# Patient Record
Sex: FEMALE | Race: WHITE | NOT HISPANIC OR LATINO | Employment: FULL TIME | URBAN - METROPOLITAN AREA
[De-identification: names, ages, dates, MRNs, and addresses within clinical notes are randomized per-mention and may not be internally consistent; named-entity substitution may affect disease eponyms.]

---

## 2020-01-15 LAB
HPV ABSTRACT: NORMAL
PAP-ABSTRACT: NORMAL

## 2020-07-27 LAB
ALT SERPL-CCNC: 12 U/L (ref 0–55)
HEP C HIM: NORMAL

## 2020-08-25 ENCOUNTER — TRANSFERRED RECORDS (OUTPATIENT)
Dept: MULTI SPECIALTY CLINIC | Facility: CLINIC | Age: 30
End: 2020-08-25

## 2020-08-25 LAB — HIV 1&2 EXT: NORMAL

## 2021-10-05 ENCOUNTER — OFFICE VISIT (OUTPATIENT)
Dept: FAMILY MEDICINE | Facility: CLINIC | Age: 31
End: 2021-10-05
Payer: COMMERCIAL

## 2021-10-05 VITALS — HEART RATE: 88 BPM | SYSTOLIC BLOOD PRESSURE: 112 MMHG | DIASTOLIC BLOOD PRESSURE: 72 MMHG | OXYGEN SATURATION: 99 %

## 2021-10-05 DIAGNOSIS — E04.1 LEFT THYROID NODULE: ICD-10-CM

## 2021-10-05 DIAGNOSIS — Z11.59 NEED FOR HEPATITIS C SCREENING TEST: ICD-10-CM

## 2021-10-05 DIAGNOSIS — Z11.4 SCREENING FOR HIV (HUMAN IMMUNODEFICIENCY VIRUS): ICD-10-CM

## 2021-10-05 DIAGNOSIS — Z00.00 ANNUAL PHYSICAL EXAM: Primary | ICD-10-CM

## 2021-10-05 PROBLEM — F41.1 GAD (GENERALIZED ANXIETY DISORDER): Status: ACTIVE | Noted: 2021-10-05

## 2021-10-05 PROBLEM — F33.1 MODERATE RECURRENT MAJOR DEPRESSION (H): Status: ACTIVE | Noted: 2021-10-05

## 2021-10-05 PROBLEM — K64.4 EXTERNAL HEMORRHOIDS: Status: ACTIVE | Noted: 2021-10-05

## 2021-10-05 PROBLEM — G43.109 MIGRAINE WITH AURA AND WITHOUT STATUS MIGRAINOSUS, NOT INTRACTABLE: Status: ACTIVE | Noted: 2021-10-05

## 2021-10-05 PROBLEM — Z86.59 HISTORY OF EATING DISORDER: Status: ACTIVE | Noted: 2021-10-05

## 2021-10-05 LAB — HIV 1+2 AB+HIV1 P24 AG SERPL QL IA: NEGATIVE

## 2021-10-05 PROCEDURE — 86803 HEPATITIS C AB TEST: CPT | Performed by: FAMILY MEDICINE

## 2021-10-05 PROCEDURE — 99385 PREV VISIT NEW AGE 18-39: CPT | Mod: 25 | Performed by: FAMILY MEDICINE

## 2021-10-05 PROCEDURE — 87389 HIV-1 AG W/HIV-1&-2 AB AG IA: CPT | Performed by: FAMILY MEDICINE

## 2021-10-05 PROCEDURE — 90686 IIV4 VACC NO PRSV 0.5 ML IM: CPT | Performed by: FAMILY MEDICINE

## 2021-10-05 PROCEDURE — 90471 IMMUNIZATION ADMIN: CPT | Performed by: FAMILY MEDICINE

## 2021-10-05 PROCEDURE — 36415 COLL VENOUS BLD VENIPUNCTURE: CPT | Performed by: FAMILY MEDICINE

## 2021-10-05 RX ORDER — LORAZEPAM 0.5 MG/1
0.5 TABLET ORAL DAILY
COMMUNITY
End: 2022-07-29

## 2021-10-05 RX ORDER — ESCITALOPRAM OXALATE 10 MG/1
10 TABLET ORAL DAILY
COMMUNITY
Start: 2020-01-08 | End: 2022-07-29

## 2021-10-05 NOTE — LETTER
October 8, 2021      Janeth Perez  [address] [place] [address]        Dear ,    We are writing to inform you of your test results.      Resulted Orders   Hepatitis C Screen Reflex to HCV RNA Quant and Genotype   Result Value Ref Range    Hepatitis C Antibody Nonreactive Nonreactive    Narrative    Assay performance characteristics have not been established for newborns, infants, and children.   HIV Antigen Antibody Combo   Result Value Ref Range    HIV Antigen Antibody Combo Negative Negative       If you have any questions or concerns, please call the clinic at the number listed above.       Sincerely,      Kristine Edwards MD

## 2021-10-05 NOTE — PROGRESS NOTES
"  ASSESSMENT/PLAN:   Janeth was seen today for physical.    Diagnoses and all orders for this visit:    Annual physical exam    Left thyroid nodule  -     US Thyroid; Future    Screening for HIV (human immunodeficiency virus)  -     HIV Antigen Antibody Combo    Need for hepatitis C screening test  -     Hepatitis C Screen Reflex to HCV RNA Quant and Genotype    Other orders  -     REVIEW OF HEALTH MAINTENANCE PROTOCOL ORDERS  -     MI FLU VAC PRESRV FREE QUAD SPLIT VIR IM  MONTHS IM        Patient has been advised of split billing requirements and indicates understanding: Yes  COUNSELING:  Reviewed preventive health counseling, as reflected in patient instructions       Regular exercise       Healthy diet/nutrition       preconception planning - start prenatal vitamins in January, even though will be using condoms    There is no height or weight on file to calculate BMI.    BMI appears normal. She is actively working on diet and exercise    She reports that she has never smoked. She has never used smokeless tobacco.    Kristine Edwards MD  [facility]     SUBJECTIVE:   CC: Janeth Perez is an 31 year old woman who presents for preventive health visit.     Janeth is new at [facility].  She had originally schedule with ROBINA Mahajan, that visit was cancelled, and she rescheduled with me. She still plans to establish with Mckenna Mahajan.     Her history is significant for anxiety and depressions - followed by psychiatry - and also a history of eating disorder. She has not known whet her weight is since completing treatment 10 years ago (she is in a \"good place\" now emotionally) , and does not ever want to see her weight, because that could trigger her.  When he close start to get tight, she knows that is the time to improve diet and exercise. Some of her good habits were impacted by the pandemic and this along with more wine lead to weight gain.  She is now working on both " of these      Patient has been advised of split billing requirements and indicates understanding: Yes  Healthy Habits:     Getting at least 3 servings of Calcium per day:  Yes    Bi-annual eye exam:  Yes    Dental care twice a year:  Yes    Sleep apnea or symptoms of sleep apnea:  Daytime drowsiness    Diet:  Other    Frequency of exercise:  2-3 days/week    Duration of exercise:  Other    Taking medications regularly:  No    Medication side effects:  Other    PHQ-2 Total Score: 0    Additional concerns today:  Yes    Exercise Previously exercises 6 days a week - then covid, depression, less walking  Diet: eats mostly vegetarian -eats pork, turkey, chicken on occasion     depression and anxiety - ongoing - has a psychiatrist and psychologist - on Lexapro 20 mg daily- wants to go off entirely by June    Reproductive Health : currently has Mirena in - planning to have this out in January after she is , then switch to condoms until June, when they will start trying to conceive    I can find HPV test done at [facility] 1/15/20 but no pap listed. Assume that done too - send for abstraction    Janeth has had  Has multiple partners.  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 Her parents wanted her to move back into town - which she did, and then Covid19 pandemic hit. Mom 's high blood pressure linked to high anxiety    She and her fiance have bought a house together    Today's PHQ-2 Score:   PHQ-2 ( 1999 Pfizer) 10/5/2021   Q1: Little interest or pleasure in doing things 0   Q2: Feeling down, depressed or hopeless 0   PHQ-2 Score 0   Q1: Little interest or pleasure in doing things Several days   Q2: Feeling down, depressed or hopeless Several days   PHQ-2 Score 2       Abuse: Current or Past (Physical, Sexual or Emotional) -  No  Do you feel safe in your environment? Yes    Have you ever done Advance Care Planning? (For example, a Health Directive, POLST, or a discussion with a medical provider or your loved ones about your wishes): No, advance care planning information given to patient to review.  Advanced care planning was discussed at today's visit.    Social History     Tobacco Use     Smoking status: Never Smoker     Smokeless tobacco: Never Used   Substance Use Topics     Alcohol use: Yes     Alcohol/week: 3.0 standard drinks     Types: 3 Glasses of wine per week         Alcohol Use 10/5/2021   Prescreen: >3 drinks/day or >7 drinks/week? Yes   AUDIT SCORE  5       Reviewed orders with patient.  Reviewed health maintenance and updated orders accordingly - Yes      Breast Cancer Screening:  Any new diagnosis of family breast, ovarian, or bowel cancer? No    FHS-7: No flowsheet data found.  History of abnormal Pap smear: NO - age 30-65 PAP every 5 years with negative HPV co-testing recommended     Reviewed and updated as needed this visit by clinical staff  Tobacco   Meds   Med Hx   Fam Hx          Reviewed and updated as needed this visit by Provider      Med Hx   Fam Hx             Review of Systems  Constitutional: negative for recent illness or change in weight  Eyes: negative for irritation and vision change  Ears, nose, mouth, throat, and face: negative for nasal congestion and sore throat  Respiratory: negative for cough and dyspnea on exertion  Cardiovascular: negative for chest pain and palpitations  Gastrointestinal: negative for abdominal pain and change in bowel habits  Genitourinary:negative for dysuria, frequency and hesitancy  Integument/breast: negative for rash  Hematologic/lymphatic: negative for bleeding and easy bruising  Musculoskeletal:negative for arthralgias and myalgias  Neurological: negative for dizziness, headaches and paresthesia       OBJECTIVE:   /72 (BP Location: Left arm, Patient Position:  Sitting, Cuff Size: Adult Regular)   Pulse 88   LMP  (LMP Unknown)   SpO2 99%   Physical Exam  General appearance - alert, well appearing, and in no distress  Mental status - normal mood, behavior, speech, dress, motor activity, and thought processes  Eyes - pupils equal and reactive, extraocular eye movements intact, funduscopic exam normal, discs flat and sharp  Ears - bilateral TM's and external ear canals normal  Mouth - mucous membranes moist, pharynx normal without lesions  Neck - supple, no significant adenopathy, carotids upstroke normal bilaterally, no bruits, thyroid exam: thyroid is normal in size;  Let thyroid nodule size of very small pea  Chest - clear to auscultation, no wheezes, rales or rhonchi, symmetric air entry  Heart - normal rate, regular rhythm, normal S1, S2, no murmurs, rubs, clicks or gallops  Abdomen - soft, nontender, nondistended, no masses or organomegaly  Breasts - breasts appear normal, no suspicious masses, no skin or nipple changes or axillary nodes  Neurological - alert, oriented, normal speech, no focal findings or movement disorder noted, DTR's normal and symmetric  Extremities - peripheral pulses normal, no pedal edema, no clubbing or cyanosis  Skin - no rashes or worrisome lesions

## 2021-10-06 ENCOUNTER — HOSPITAL ENCOUNTER (OUTPATIENT)
Dept: ULTRASOUND IMAGING | Facility: CLINIC | Age: 31
Discharge: HOME | End: 2021-10-06
Attending: FAMILY MEDICINE | Admitting: FAMILY MEDICINE
Payer: COMMERCIAL

## 2021-10-06 DIAGNOSIS — E04.1 LEFT THYROID NODULE: ICD-10-CM

## 2021-10-06 LAB — HCV AB SERPL QL IA: NONREACTIVE

## 2021-10-06 PROCEDURE — 76536 US EXAM OF HEAD AND NECK: CPT

## 2021-10-07 DIAGNOSIS — N64.4 BREAST PAIN, LEFT: ICD-10-CM

## 2021-10-07 DIAGNOSIS — Z11.59 ENCOUNTER FOR SCREENING FOR OTHER VIRAL DISEASES: ICD-10-CM

## 2021-10-07 DIAGNOSIS — E04.1 LEFT THYROID NODULE: Primary | ICD-10-CM

## 2021-10-07 NOTE — PROGRESS NOTES
Called to discuss need for FNA thyroid nodule - TIRADS 5 on ultrasound    Also, Janeth says that for a year  she has been having random, focal pain in left brest only (very occasionally in right, but commonly in left), Does not have menses due to Mirena. Is this normal?  No. Is it worrisome? MOST likely not - most patients - all but one - whom I have sent for evaluation have had normal studies. Hard spot - it is not covered as preventive, and very likely will be normal, but we dont'want to miss anything. She understands and will call to schedule breast studies as well as thyroid ultrasound guided FNA

## 2021-10-18 ENCOUNTER — LAB (OUTPATIENT)
Dept: LAB | Facility: CLINIC | Age: 31
End: 2021-10-18
Payer: COMMERCIAL

## 2021-10-18 DIAGNOSIS — Z11.59 ENCOUNTER FOR SCREENING FOR OTHER VIRAL DISEASES: ICD-10-CM

## 2021-10-18 PROCEDURE — U0003 INFECTIOUS AGENT DETECTION BY NUCLEIC ACID (DNA OR RNA); SEVERE ACUTE RESPIRATORY SYNDROME CORONAVIRUS 2 (SARS-COV-2) (CORONAVIRUS DISEASE [COVID-19]), AMPLIFIED PROBE TECHNIQUE, MAKING USE OF HIGH THROUGHPUT TECHNOLOGIES AS DESCRIBED BY CMS-2020-01-R: HCPCS

## 2021-10-18 PROCEDURE — U0005 INFEC AGEN DETEC AMPLI PROBE: HCPCS

## 2021-10-19 LAB — SARS-COV-2 RNA RESP QL NAA+PROBE: NEGATIVE

## 2021-10-20 ENCOUNTER — HOSPITAL ENCOUNTER (OUTPATIENT)
Dept: MAMMOGRAPHY | Facility: CLINIC | Age: 31
End: 2021-10-20
Attending: FAMILY MEDICINE
Payer: COMMERCIAL

## 2021-10-20 ENCOUNTER — HOSPITAL ENCOUNTER (OUTPATIENT)
Dept: ULTRASOUND IMAGING | Facility: CLINIC | Age: 31
End: 2021-10-20
Attending: FAMILY MEDICINE
Payer: COMMERCIAL

## 2021-10-20 DIAGNOSIS — E04.1 LEFT THYROID NODULE: ICD-10-CM

## 2021-10-20 DIAGNOSIS — N64.4 BREAST PAIN, LEFT: ICD-10-CM

## 2021-10-20 PROCEDURE — 88173 CYTOPATH EVAL FNA REPORT: CPT | Mod: TC | Performed by: FAMILY MEDICINE

## 2021-10-20 PROCEDURE — 76642 ULTRASOUND BREAST LIMITED: CPT | Mod: 50

## 2021-10-20 PROCEDURE — 272N000431 US BIOPSY THYROID FINE NEEDLE ASPIRATION

## 2021-10-20 PROCEDURE — 88172 CYTP DX EVAL FNA 1ST EA SITE: CPT | Mod: TC | Performed by: FAMILY MEDICINE

## 2021-10-20 PROCEDURE — 77062 BREAST TOMOSYNTHESIS BI: CPT

## 2021-10-20 PROCEDURE — 88305 TISSUE EXAM BY PATHOLOGIST: CPT | Mod: TC | Performed by: FAMILY MEDICINE

## 2021-10-22 DIAGNOSIS — E04.1 THYROID NODULE: Primary | ICD-10-CM

## 2021-10-22 PROCEDURE — 88342 IMHCHEM/IMCYTCHM 1ST ANTB: CPT | Mod: 26 | Performed by: PATHOLOGY

## 2021-10-22 PROCEDURE — 88305 TISSUE EXAM BY PATHOLOGIST: CPT | Mod: 26 | Performed by: PATHOLOGY

## 2021-10-22 PROCEDURE — 88173 CYTOPATH EVAL FNA REPORT: CPT | Mod: 26 | Performed by: PATHOLOGY

## 2021-10-22 PROCEDURE — 88341 IMHCHEM/IMCYTCHM EA ADD ANTB: CPT | Mod: 26 | Performed by: PATHOLOGY

## 2021-10-27 LAB — SPECIMEN STATUS: NORMAL

## 2021-10-28 ENCOUNTER — MYC MEDICAL ADVICE (OUTPATIENT)
Dept: FAMILY MEDICINE | Facility: CLINIC | Age: 31
End: 2021-10-28

## 2021-11-03 ENCOUNTER — OFFICE VISIT (OUTPATIENT)
Dept: OTOLARYNGOLOGY | Facility: CLINIC | Age: 31
End: 2021-11-03
Attending: FAMILY MEDICINE
Payer: COMMERCIAL

## 2021-11-03 DIAGNOSIS — E04.1 THYROID NODULE: ICD-10-CM

## 2021-11-03 PROCEDURE — 99204 OFFICE O/P NEW MOD 45 MIN: CPT | Performed by: OTOLARYNGOLOGY

## 2021-11-03 RX ORDER — ESCITALOPRAM OXALATE 20 MG/1
20 TABLET ORAL DAILY
COMMUNITY
Start: 2021-08-06 | End: 2022-07-29

## 2021-11-03 RX ORDER — VALACYCLOVIR HYDROCHLORIDE 1 G/1
TABLET, FILM COATED ORAL DAILY PRN
COMMUNITY
Start: 2021-08-12 | End: 2023-06-09

## 2021-11-03 NOTE — PROGRESS NOTES
HPI: This patient is a 32yo F who presents to clinic for evaluation of a left thyroid nodule at the request of Dr. Edwards. It was picked up on a routine health exam, which prompted all the workup. The patient had noticed a bump in her neck several years ago, but didn't really have any significant concerns. Denies fevers, unintentional weight loss, odynophagia, dysphagia, hemoptysis, voice changes, and shortness of breath. Reports no thyroid disease in her family that she is aware of.    Past medical history, surgical history, social history, family history, medications, and allergies have been reviewed with the patient and are documented above.    Review of Systems: a 10-system review was performed. Pertinent positives are noted in the HPI and on a separate scanned document in the chart.    PHYSICAL EXAMINATION:  GEN: no acute distress, normocephalic  EYES: extraocular movements are intact, pupils are equal and round. Sclera clear.   EARS: auricles are normally formed. The external auditory canals are clear with minimal to no cerumen. Tympanic membranes are intact bilaterally with no signs of infection, effusion, retractions, or perforations.  NOSE: anterior nares are patent. There are no masses or lesions. The septum is non-obstructing.  OC/OP: clear, dentition is in good repair. The tongue and palate are fully mobile and symmetric. No masses or lesions.   NECK: soft and supple. No lymphadenopathy or masses. Airway is midline.  NEURO: CN VII and XII symmetric. alert and oriented. No spontaneous nystagmus. Gait is normal.  PULM: breathing comfortably on room air, normal chest expansion with respiration  CARDS: no cyanosis or clubbing, normal carotid pulses    THYROID U/S:  FINDINGS:  RIGHT lobe: 4.8 x 2.2 x 1.1 cm. Homogeneous echotexture. Few incidental 3 to 4 mm cysts. No solid nodule.  Isthmus: 3 mm.  LEFT lobe: 4.9 x 1.7 x 1.2 cm. Homogeneous echotexture. Solitary medial lower pole nodule.     NECK: No cervical  lymphadenopathy.     NODULES:  Nodule 1: Inferior medial left thyroid lobe nodule measuring 1.4 x 0.9 x 0.9 cm.   Composition: Solid or almost completely solid, 2 points   Echogenicity: Hypoechoic, 2 points   Shape: Wider-than-tall, 0 points   Margin: Smooth, 0 points   Echogenic Foci: Punctate echoic foci, 3 points   Point Total: 7 points or more. TI-RADS 5. If 1 cm or larger, recommend FNA; if 0.5 cm or larger, follow up US annually for 5 years.    THYROID FNA:  LEFT THYROID NODULE, FINE NEEDLE ASPIRATION WITH CELL BLOCK PREPARATION:    ATYPICAL (PLEASE SEE COMMENT: Cytology at the time of adequacy and currently demonstrates features consistent with a reactive thyroid nodule; however, the cell block demonstrates a small cluster of cells with cytologic features suspicious for papillary carcinoma (smooth chromatin, nuclear grooves, faint intranuclear inclusions).)     ADDENDUM: The purpose of the addendum is to report results of additional testing. Pending Afirma results at the time of dictation. BRAF, V600E immunohistochemistry is negative, arguing strongly against papillary carcinoma.     AFFIRMA: pending    MEDICAL DECISION-MAKING: This patient is a 30yo F with a left thyroid nodule that on initial biopsy showed some features raising the question of PTC, but specific staining that followed argues against PTC and Affirma testing is still pending. Had a lengthy discussion regarding these results and what her options are. Given that special stains have suggested that the nodule is not PTC, her choices are to continue to follow this nodule clinically with repeat U/Ss at regular intervals to assess for growth/change or she can elect to remove the left thyroid lobe/isthmus. Went over the risks and benefits of both options. She will await the Affirma testing results to decide how she wishes to proceed. My contact information was given to her.

## 2021-11-03 NOTE — LETTER
11/3/2021         RE: Janeth Perez  [address] [place] [address]        Dear Colleague,    Thank you for referring your patient, Janeth Perez, to the [facility]. Please see a copy of my visit note below.    HPI: This patient is a 32yo F who presents to clinic for evaluation of a left thyroid nodule at the request of Dr. Edwards. It was picked up on a routine health exam, which prompted all the workup. The patient had noticed a bump in her neck several years ago, but didn't really have any significant concerns. Denies fevers, unintentional weight loss, odynophagia, dysphagia, hemoptysis, voice changes, and shortness of breath. Reports no thyroid disease in her family that she is aware of.    Past medical history, surgical history, social history, family history, medications, and allergies have been reviewed with the patient and are documented above.    Review of Systems: a 10-system review was performed. Pertinent positives are noted in the HPI and on a separate scanned document in the chart.    PHYSICAL EXAMINATION:  GEN: no acute distress, normocephalic  EYES: extraocular movements are intact, pupils are equal and round. Sclera clear.   EARS: auricles are normally formed. The external auditory canals are clear with minimal to no cerumen. Tympanic membranes are intact bilaterally with no signs of infection, effusion, retractions, or perforations.  NOSE: anterior nares are patent. There are no masses or lesions. The septum is non-obstructing.  OC/OP: clear, dentition is in good repair. The tongue and palate are fully mobile and symmetric. No masses or lesions.   NECK: soft and supple. No lymphadenopathy or masses. Airway is midline.  NEURO: CN VII and XII symmetric. alert and oriented. No spontaneous nystagmus. Gait is normal.  PULM: breathing comfortably on room air, normal chest expansion with respiration  CARDS: no cyanosis or clubbing, normal carotid  pulses    THYROID U/S:  FINDINGS:  RIGHT lobe: 4.8 x 2.2 x 1.1 cm. Homogeneous echotexture. Few incidental 3 to 4 mm cysts. No solid nodule.  Isthmus: 3 mm.  LEFT lobe: 4.9 x 1.7 x 1.2 cm. Homogeneous echotexture. Solitary medial lower pole nodule.     NECK: No cervical lymphadenopathy.     NODULES:  Nodule 1: Inferior medial left thyroid lobe nodule measuring 1.4 x 0.9 x 0.9 cm.   Composition: Solid or almost completely solid, 2 points   Echogenicity: Hypoechoic, 2 points   Shape: Wider-than-tall, 0 points   Margin: Smooth, 0 points   Echogenic Foci: Punctate echoic foci, 3 points   Point Total: 7 points or more. TI-RADS 5. If 1 cm or larger, recommend FNA; if 0.5 cm or larger, follow up US annually for 5 years.    THYROID FNA:  LEFT THYROID NODULE, FINE NEEDLE ASPIRATION WITH CELL BLOCK PREPARATION:    ATYPICAL (PLEASE SEE COMMENT: Cytology at the time of adequacy and currently demonstrates features consistent with a reactive thyroid nodule; however, the cell block demonstrates a small cluster of cells with cytologic features suspicious for papillary carcinoma (smooth chromatin, nuclear grooves, faint intranuclear inclusions).)     ADDENDUM: The purpose of the addendum is to report results of additional testing. Pending Afirma results at the time of dictation. BRAF, V600E immunohistochemistry is negative, arguing strongly against papillary carcinoma.     AFFIRMA: pending    MEDICAL DECISION-MAKING: This patient is a 32yo F with a left thyroid nodule that on initial biopsy showed some features raising the question of PTC, but specific staining that followed argues against PTC and Affirma testing is still pending. Had a lengthy discussion regarding these results and what her options are. Given that special stains have suggested that the nodule is not PTC, her choices are to continue to follow this nodule clinically with repeat U/Ss at regular intervals to assess for growth/change or she can elect to remove the left  thyroid lobe/isthmus. Went over the risks and benefits of both options. She will await the Affirma testing results to decide how she wishes to proceed. My contact information was given to her.           Again, thank you for allowing me to participate in the care of your patient.        Sincerely,        Sandra Freedman MD

## 2021-11-04 NOTE — TELEPHONE ENCOUNTER
"Contacted [facility] laboratory and was told that the specimen was sent to them as \"technical only\", the specimen was stained and sent back to [facility] for interpretation. Dr. Edwards updated.   "

## 2021-11-11 ENCOUNTER — TELEPHONE (OUTPATIENT)
Dept: OTOLARYNGOLOGY | Facility: CLINIC | Age: 31
End: 2021-11-11
Payer: COMMERCIAL

## 2021-11-11 NOTE — TELEPHONE ENCOUNTER
Left a message for Janeth that her Affirma test results have come in. Will try to contact her again to discuss.

## 2021-11-18 ENCOUNTER — TELEPHONE (OUTPATIENT)
Dept: OTOLARYNGOLOGY | Facility: CLINIC | Age: 31
End: 2021-11-18
Payer: COMMERCIAL

## 2021-11-18 NOTE — TELEPHONE ENCOUNTER
Janeth called and said she has been trying for a week to get her test results.  She would like a call back as soon as possible.    Please call Janeth regarding her test results.    Thanks!

## 2021-11-19 ENCOUNTER — TELEPHONE (OUTPATIENT)
Dept: OTOLARYNGOLOGY | Facility: CLINIC | Age: 31
End: 2021-11-19
Payer: COMMERCIAL

## 2021-11-19 NOTE — TELEPHONE ENCOUNTER
Left another message for Janeth trying to reach her with her Affirma testing results. At this point, we are having difficulty connecting by phone. When she returns the call, advised that she be scheduled with me in clinic to go over every thing in person.

## 2021-11-24 ENCOUNTER — OFFICE VISIT (OUTPATIENT)
Dept: OTOLARYNGOLOGY | Facility: CLINIC | Age: 31
End: 2021-11-24
Payer: COMMERCIAL

## 2021-11-24 ENCOUNTER — IMMUNIZATION (OUTPATIENT)
Dept: NURSING | Facility: CLINIC | Age: 31
End: 2021-11-24
Payer: COMMERCIAL

## 2021-11-24 DIAGNOSIS — E04.1 THYROID NODULE: Primary | ICD-10-CM

## 2021-11-24 PROCEDURE — 0004A PR COVID VAC PFIZER DIL RECON 30 MCG/0.3 ML IM: CPT

## 2021-11-24 PROCEDURE — 91300 PR COVID VAC PFIZER DIL RECON 30 MCG/0.3 ML IM: CPT

## 2021-11-24 PROCEDURE — 99214 OFFICE O/P EST MOD 30 MIN: CPT | Performed by: OTOLARYNGOLOGY

## 2021-11-24 NOTE — LETTER
11/24/2021        RE: Janeth Perez  [address]  [place] [address]        HPI: This patient is a 30yo F who presents to clinic for discussion of the final results of a left thyroid nodule. It was picked up on a routine health exam, which prompted all the workup. The patient had noticed a bump in her neck several years ago, but didn't really have any significant concerns. Denies fevers, unintentional weight loss, odynophagia, dysphagia, hemoptysis, voice changes, and shortness of breath. Reports no thyroid disease in her family that she is aware of.     Past medical history, surgical history, social history, family history, medications, and allergies have been reviewed with the patient and are documented above.     Review of Systems: a 10-system review was performed. Pertinent positives are noted in the HPI and on a separate scanned document in the chart.     PHYSICAL EXAMINATION:  GEN: no acute distress, normocephalic  EYES: extraocular movements are intact, pupils are equal and round. Sclera clear.   EARS: auricles are normally formed.   NEURO: alert and oriented. No spontaneous nystagmus. Gait is normal.  PULM: breathing comfortably on room air, normal chest expansion with respiration     THYROID U/S:  FINDINGS:  RIGHT lobe: 4.8 x 2.2 x 1.1 cm. Homogeneous echotexture. Few incidental 3 to 4 mm cysts. No solid nodule.  Isthmus: 3 mm.  LEFT lobe: 4.9 x 1.7 x 1.2 cm. Homogeneous echotexture. Solitary medial lower pole nodule.     NECK: No cervical lymphadenopathy.     NODULES:  Nodule 1: Inferior medial left thyroid lobe nodule measuring 1.4 x 0.9 x 0.9 cm.   Composition: Solid or almost completely solid, 2 points   Echogenicity: Hypoechoic, 2 points   Shape: Wider-than-tall, 0 points   Margin: Smooth, 0 points   Echogenic Foci: Punctate echoic foci, 3 points   Point Total: 7 points or more. TI-RADS 5. If 1 cm or larger, recommend FNA; if 0.5 cm or larger, follow up US annually for 5  years.     THYROID FNA:  LEFT THYROID NODULE, FINE NEEDLE ASPIRATION WITH CELL BLOCK PREPARATION:    ATYPICAL (PLEASE SEE COMMENT: Cytology at the time of adequacy and currently demonstrates features consistent with a reactive thyroid nodule; however, the cell block demonstrates a small cluster of cells with cytologic features suspicious for papillary carcinoma (smooth chromatin, nuclear grooves, faint intranuclear inclusions).)     ADDENDUM: The purpose of the addendum is to report results of additional testing. BRAF, V600E immunohistochemistry is negative, arguing strongly against papillary carcinoma.     AFFIRMA: 50% risk of carcinoma     MEDICAL DECISION-MAKING: This patient is a 32yo F with a left thyroid nodule that on initial biopsy showed some features raising the question of PTC, but specific staining that followed argued against PTC. The Affirma testing was still pending at her initial evaluation, but has come back now showing a 50% risk of carcinoma. Had a lengthy discussion regarding these results and how this plays in with the other data that we have. Her choices are still to continue to follow this nodule clinically with repeat U/Ss at regular intervals to assess for growth/change +/- repeat biopsies in the future, or now that we know she is at higher risk for developing thyroid carcinoma, it would be the better choice to remove the left thyroid lobe/isthmus. Went over the risks and benefits of both options thoroughly. We did come to the conclusion that a thyroid lobectomy is the right move. She is planning a wedding that will occur in July and would like to wait until after the wedding if that is acceptable. I told her that it is okay to wait until after her wedding. Will submit her info to my surgery scheduler so that she can try to get this scheduled soon after the big event. 30min visit, 100% spent discussing results and above plan.         Sincerely,        Sandra Freedman MD

## 2021-11-24 NOTE — PROGRESS NOTES
HPI: This patient is a 30yo F who presents to clinic for discussion of the final results of a left thyroid nodule. It was picked up on a routine health exam, which prompted all the workup. The patient had noticed a bump in her neck several years ago, but didn't really have any significant concerns. Denies fevers, unintentional weight loss, odynophagia, dysphagia, hemoptysis, voice changes, and shortness of breath. Reports no thyroid disease in her family that she is aware of.     Past medical history, surgical history, social history, family history, medications, and allergies have been reviewed with the patient and are documented above.     Review of Systems: a 10-system review was performed. Pertinent positives are noted in the HPI and on a separate scanned document in the chart.     PHYSICAL EXAMINATION:  GEN: no acute distress, normocephalic  EYES: extraocular movements are intact, pupils are equal and round. Sclera clear.   EARS: auricles are normally formed.   NEURO: alert and oriented. No spontaneous nystagmus. Gait is normal.  PULM: breathing comfortably on room air, normal chest expansion with respiration     THYROID U/S:  FINDINGS:  RIGHT lobe: 4.8 x 2.2 x 1.1 cm. Homogeneous echotexture. Few incidental 3 to 4 mm cysts. No solid nodule.  Isthmus: 3 mm.  LEFT lobe: 4.9 x 1.7 x 1.2 cm. Homogeneous echotexture. Solitary medial lower pole nodule.     NECK: No cervical lymphadenopathy.     NODULES:  Nodule 1: Inferior medial left thyroid lobe nodule measuring 1.4 x 0.9 x 0.9 cm.   Composition: Solid or almost completely solid, 2 points   Echogenicity: Hypoechoic, 2 points   Shape: Wider-than-tall, 0 points   Margin: Smooth, 0 points   Echogenic Foci: Punctate echoic foci, 3 points   Point Total: 7 points or more. TI-RADS 5. If 1 cm or larger, recommend FNA; if 0.5 cm or larger, follow up US annually for 5 years.     THYROID FNA:  LEFT THYROID NODULE, FINE NEEDLE ASPIRATION WITH CELL BLOCK  PREPARATION:    ATYPICAL (PLEASE SEE COMMENT: Cytology at the time of adequacy and currently demonstrates features consistent with a reactive thyroid nodule; however, the cell block demonstrates a small cluster of cells with cytologic features suspicious for papillary carcinoma (smooth chromatin, nuclear grooves, faint intranuclear inclusions).)     ADDENDUM: The purpose of the addendum is to report results of additional testing. BRAF, V600E immunohistochemistry is negative, arguing strongly against papillary carcinoma.     AFFIRMA: 50% risk of carcinoma     MEDICAL DECISION-MAKING: This patient is a 32yo F with a left thyroid nodule that on initial biopsy showed some features raising the question of PTC, but specific staining that followed argued against PTC. The Affirma testing was still pending at her initial evaluation, but has come back now showing a 50% risk of carcinoma. Had a lengthy discussion regarding these results and how this plays in with the other data that we have. Her choices are still to continue to follow this nodule clinically with repeat U/Ss at regular intervals to assess for growth/change +/- repeat biopsies in the future, or now that we know she is at higher risk for developing thyroid carcinoma, it would be the better choice to remove the left thyroid lobe/isthmus. Went over the risks and benefits of both options thoroughly. We did come to the conclusion that a thyroid lobectomy is the right move. She is planning a wedding that will occur in July and would like to wait until after the wedding if that is acceptable. I told her that it is okay to wait until after her wedding. Will submit her info to my surgery scheduler so that she can try to get this scheduled soon after the big event. 30min visit, 100% spent discussing results and above plan.

## 2021-12-06 ENCOUNTER — TELEPHONE (OUTPATIENT)
Dept: OTOLARYNGOLOGY | Facility: CLINIC | Age: 31
End: 2021-12-06
Payer: COMMERCIAL

## 2021-12-06 NOTE — TELEPHONE ENCOUNTER
Spoke with Janeth over the phone today regarding surgery scheduling with Dr. Ramos MSC on  date: 6/20/2022.    Covid Test: Date: 6/16/2022 at 9:00 am, Location: [facility]  Post Op: Date: 7/1/2022 at 7:00 am, Location: [facility]    Letter sent via [email or web address]

## 2021-12-06 NOTE — LETTER
We've received instruction to get you scheduled for surgery with Dr Freedman. We have that arranged as follows:     Surgery Date: 6/20/2022  Location: [address], [address] (3rd floor) [facility]  Arrival Time: 11:00 am (Unless instructed differently by the pre-op call nurse)     Post op Appointment: 7/1/2022 at 7:00 am with Dr. Freedman     Prep Instructions:     1. Please schedule a pre-op physical with your primary care doctor. This may be virtual or face-to-face depending on your doctors preference. Call them right away to schedule this.    2. PCR-Rated COVID19 testing is required within 4 days of surgery. We have this scheduled for you at [facility], [address] [place] [address], 1st Floor on 6/16/2022 at 9:00 am. Follow the specific instructions you receive by [place]. If your test is positive, your surgery will be canceled.     3. Nothing to eat or drink for 8 hours before surgery unless instructed differently by the pre-op nurse.    4. If the community sees a new COVID19 surge, your procedure may need to be postponed. We will contact you if this happens.     5. You will need an adult to drive you home and stay with you 24 hours after surgery.     6. You may have one family member wait in the lobby at the surgery center during your surgery. Visitor restrictions are subject to change, please verify with the pre-op nurse when they call.    7. When you arrive to the surgery center, you will be screened for COVID19 symptoms. If you screen positive, your surgery will need to be postponed.    8. We always encourage you to notify your insurance any time you have medical tests or procedures scheduled including surgery. The number is usually right on the back of your insurance card. Please call [facility] Cost of Care at [phone] for the surgeon fees, and [facility] Cost of Care [phone] for facility fees if you need pricing information.     9. You will  receive a call from a pre-op call nurse 1-3 days prior to surgery. She will go over more details with you.     Call our office if you have any questions! Thank you!

## 2022-01-12 PROCEDURE — 88172 CYTP DX EVAL FNA 1ST EA SITE: CPT | Mod: 26 | Performed by: PATHOLOGY

## 2022-02-01 PROBLEM — E04.1 THYROID NODULE: Status: ACTIVE | Noted: 2022-02-01

## 2022-06-08 ENCOUNTER — TELEPHONE (OUTPATIENT)
Dept: OTOLARYNGOLOGY | Facility: CLINIC | Age: 32
End: 2022-06-08

## 2022-06-08 NOTE — TELEPHONE ENCOUNTER
Janeth wanted to get back on Dr. Freedman's schedule,  I informed her that her order may change and I will only say that I can try to put her back on for Aug 9th a Tuesday.  She will meet with  on 6/22 after that It can be confirmed.  I will try and work on it but did not promise anything.  Pt seemed to understand this.

## 2022-06-22 ENCOUNTER — OFFICE VISIT (OUTPATIENT)
Dept: OTOLARYNGOLOGY | Facility: CLINIC | Age: 32
End: 2022-06-22
Payer: COMMERCIAL

## 2022-06-22 DIAGNOSIS — E04.1 THYROID NODULE: Primary | ICD-10-CM

## 2022-06-22 PROCEDURE — 99214 OFFICE O/P EST MOD 30 MIN: CPT | Performed by: OTOLARYNGOLOGY

## 2022-06-22 RX ORDER — LORAZEPAM 0.5 MG/1
0.5 TABLET ORAL EVERY 6 HOURS PRN
COMMUNITY

## 2022-06-22 RX ORDER — CITALOPRAM HYDROBROMIDE 20 MG/1
20 TABLET ORAL
COMMUNITY
End: 2022-07-29

## 2022-06-22 NOTE — PROGRESS NOTES
HPI: This patient is a 32yo F who presents back to clinic for discussion of her thyroid. She has a nodule that was Affirma tested with 50% risk of malignancy. She had another biopsy and U/S done at [facility], where they basically told her it was nothing and she did not feel confident or comfortable with those discussions. Denies fevers, unintentional weight loss, odynophagia, dysphagia, hemoptysis, voice changes, and shortness of breath. Reports no thyroid disease in her family that she is aware of.     Past medical history, surgical history, social history, family history, medications, and allergies have been reviewed with the patient and are documented above.     Review of Systems: a 10-system review was performed. Pertinent positives are noted in the HPI and on a separate scanned document in the chart.     PHYSICAL EXAMINATION:  None     THYROID U/S 10/21:  FINDINGS:  RIGHT lobe: 4.8 x 2.2 x 1.1 cm. Homogeneous echotexture. Few incidental 3 to 4 mm cysts. No solid nodule.  Isthmus: 3 mm.  LEFT lobe: 4.9 x 1.7 x 1.2 cm. Homogeneous echotexture. Solitary medial lower pole nodule.     NECK: No cervical lymphadenopathy.     NODULES:  Nodule 1: Inferior medial left thyroid lobe nodule measuring 1.4 x 0.9 x 0.9 cm.   Composition: Solid or almost completely solid, 2 points   Echogenicity: Hypoechoic, 2 points   Shape: Wider-than-tall, 0 points   Margin: Smooth, 0 points   Echogenic Foci: Punctate echoic foci, 3 points   Point Total: 7 points or more. TI-RADS 5. If 1 cm or larger, recommend FNA; if 0.5 cm or larger, follow up US annually for 5 years.     THYROID FNA 10/21:  LEFT THYROID NODULE, FINE NEEDLE ASPIRATION WITH CELL BLOCK PREPARATION:    ATYPICAL (PLEASE SEE COMMENT: Cytology at the time of adequacy and currently demonstrates features consistent with a reactive thyroid nodule; however, the cell block demonstrates a small cluster of cells with cytologic features suspicious for papillary carcinoma (smooth chromatin,  nuclear grooves, faint intranuclear inclusions).)     ADDENDUM: The purpose of the addendum is to report results of additional testing. BRAF, V600E immunohistochemistry is negative, arguing strongly against papillary carcinoma.     AFFIRMA: 50% risk of carcinoma     THYROID U/S ([place]) 4/22:  FINDINGS:   The right thyroid lobe measures: 1.7 cm x 1.4 cm x 5.0 cm   The left thyroid lobe measures: 1.2 cm x 1.6 cm x 5.1 cm   The isthmus measures: 3 mm in AP diameter.   Thyroid parenchymal evaluation shows: 1.0 x 0.8 x 1.4 cm solid, heterogeneous but predominantly   isoechoic, slightly taller than wide, lobulated nodule containing multiple central   microcalcifications and incomplete peripheral calcification. Nodule has not changed in size since   the comparison exam and remains suspicion for malignancy (>20%). Additional stable 0.2 x 0.3 x 0.4   cm predominantly solid, hypoechoic, not taller than wide, smoothly marginated nodule without   echogenic foci in the right side of the isthmus; intermediate suspicion. Normal background thyroid   echotexture.     THYROID FNA ([place]) 4/22:  Thyroid, left isthmus, FNA/cell block ([id number]; 10/20/2021):  Atypical follicular cells.  A reactive,   hyperplastic lesion is favored. No Affirma testing done on this sample    MEDICAL DECISION-MAKING: This patient is a 32yo F with a left thyroid nodule that on initial biopsy showed some features raising the question of PTC, but specific staining that followed argued against PTC. The Affirma testing was still pending at her initial evaluation, but has come back now showing a 50% risk of carcinoma. Had another lengthy discussion regarding these results and how this plays in with the other data that we have, includin the new data from [facility]. She is coming to the conclusion that pursuing thyroid lobectomy is how she would like to proceed, as the risk has been weighing on her. She is not on-board with total thyroidectomy in absence of  overt cancer on the right side, but it okay removing the left and the isthmus. Went over the risks and benefits thoroughly. We did come to the conclusion that a thyroid lobectomy is the right move. All her, and her family's questions, were answered. Will submit her info to my surgery scheduler so that she can try to get this scheduled soon after the big event. 30min visit, 100% spent discussing results and above plan.

## 2022-06-27 NOTE — TELEPHONE ENCOUNTER
Left message that I have scheduled appts for her for Surgery with Dr Freedman.  If preop was made at  make sure they know it is a pre-op.  The info below is what I will be discussing with her when we actually talk.  _____    INFO TO GO OVER:    We've received instruction to get you scheduled for Outpatient Surgery with Dr rFeedman. We have that arranged as follows:     Pre-op Physical:  7/29/2022 arriving at 8:10 a.m. with Dr. Castro at the [facility], [address] [place] [address]    Surgery Date: 8/16/2022     Location: [facility],  [address] , [facility] [address]    Approximate Arrival Time: 5:30 a.m.  (Unless instructed differently by the pre-op call nurse)     Post op Appointment: 8/24/2022 at 10:15 a.m. at  with Dr. Freedman at the [facility], [address] [facility] [address]      Prep Tasks and Info:     1. Schedule a pre-op physical with your primary care doctor within 30 days of surgery. This is required by anesthesia and if not done, your surgery will be cancelled. Call them asap to get this scheduled.    2. Review your medications with your primary care or prescribing physician; they will advise you which meds to stop and when, and when you can resume taking.  Certain medications like blood thinners need to be stopped in advance of surgery to proceed safely.    3. You must get tested for COVID-19, even if you are vaccinated.    Outpatient Surgery:  If you are going home the same day of surgery, a home rapid antigen Covid-19 test is required 1-2 days before surgery- regardless of your vaccination status.  Take a photo of the negative result and show to the nurse on the day of surgery. If you test positive, contact our office right away to reschedule surgery. You can buy a home Covid-19 Rapid Antigen test at many local pharmacies, or you can order for free at covid.gov/tests.    4. Please shower the evening before and morning of  surgery with Hibiclens or Exidine soap.  This can be found at your local pharmacy.    5. Fasting instructions will be provided by the pre-op nurse who will call you 1-3 days before surgery.  Typically we advise normal food up to 8 hours before surgery then clear liquids only up to 2 hours before surgery then nothing at all by mouth for 2 hours including no gum or candy.  The nurse will review your specific instructions with you at the call.      6. Smoking impacts your body's ability to heal properly.  If you are a smoker, we strongly urge you to stop smoking 4-6 weeks before surgery. Plastic surgery patients are required to be nicotine free for 6-8 weeks before surgery.     7. You will need an adult to drive you home and stay with you 24 hours after surgery. Public transportation or Medical Van Services are not permitted.    8. You may have one family member wait in the lobby at the surgery center during your surgery. Visitor restrictions are subject to change, please verify with the pre-op nurse when they call.    9. If the community sees a new COVID19 surge, your procedure may need to be postponed. We will contact you if this happens. You will be screened for high-risk exposure to Covid-19 during the pre-op call.  We encourage you to quarantine yourself away from any Covid-19 people for 10 days before surgery to avoid possible last minute cancellations.   When you arrive to the surgery center, you will again be screened for COVID19 symptoms. If you screen positive, your surgery will need to be postponed.    10. We always encourage you to notify your insurance any time you have medical tests or procedures scheduled including surgery. The number is usually right on the back of your insurance card. Please call  [employer] [facility] Cost of Care at [phone] if you'd like a surgery quote.       Call our office if you have any questions! Thank you!       Surgery Letter will be sent via [employer]  After it is reviewed  with pt/6/27!!

## 2022-06-27 NOTE — TELEPHONE ENCOUNTER
Spoke with Janeth and went over all instructions listed below, sent letter via [employer] and she will call [employer] to inform them of the pre-op/annual being combined if possible!      We've received instruction to get you scheduled for Outpatient Surgery with Dr Freedman. We have that arranged as follows:      Pre-op Physical:  7/29/2022 arriving at 8:10 a.m. with Dr. Castro at the [facility], [address] [place] [address]     Surgery Date: 8/16/2022      Location: [facility],  [address] , [facility] [address]     Approximate Arrival Time: 5:30 a.m.  (Unless instructed differently by the pre-op call nurse)      Post op Appointment: 8/24/2022 at 10:15 a.m. at  with Dr. Freedman at the [facility], [address] [facility] [address]        Prep Tasks and Info:      1. Schedule a pre-op physical with your primary care doctor within 30 days of surgery. This is required by anesthesia and if not done, your surgery will be cancelled. Call them asap to get this scheduled.     2. Review your medications with your primary care or prescribing physician; they will advise you which meds to stop and when, and when you can resume taking.  Certain medications like blood thinners need to be stopped in advance of surgery to proceed safely.     3. You must get tested for COVID-19, even if you are vaccinated.     Outpatient Surgery:  If you are going home the same day of surgery, a home rapid antigen Covid-19 test is required 1-2 days before surgery- regardless of your vaccination status.  Take a photo of the negative result and show to the nurse on the day of surgery. If you test positive, contact our office right away to reschedule surgery. You can buy a home Covid-19 Rapid Antigen test at many local pharmacies, or you can order for free at covid.gov/tests.     4. Please shower the evening before and morning of surgery with Hibiclens or Exidine soap.  This can be found  at your local pharmacy.     5. Fasting instructions will be provided by the pre-op nurse who will call you 1-3 days before surgery.  Typically we advise normal food up to 8 hours before surgery then clear liquids only up to 2 hours before surgery then nothing at all by mouth for 2 hours including no gum or candy.  The nurse will review your specific instructions with you at the call.       6. Smoking impacts your body's ability to heal properly.  If you are a smoker, we strongly urge you to stop smoking 4-6 weeks before surgery. Plastic surgery patients are required to be nicotine free for 6-8 weeks before surgery.      7. You will need an adult to drive you home and stay with you 24 hours after surgery. Public transportation or Medical Van Services are not permitted.     8. You may have one family member wait in the lobby at the surgery center during your surgery. Visitor restrictions are subject to change, please verify with the pre-op nurse when they call.     9. If the community sees a new COVID19 surge, your procedure may need to be postponed. We will contact you if this happens. You will be screened for high-risk exposure to Covid-19 during the pre-op call.  We encourage you to quarantine yourself away from any Covid-19 people for 10 days before surgery to avoid possible last minute cancellations.   When you arrive to the surgery center, you will again be screened for COVID19 symptoms. If you screen positive, your surgery will need to be postponed.     10. We always encourage you to notify your insurance any time you have medical tests or procedures scheduled including surgery. The number is usually right on the back of your insurance card. Please call [facility] Cost of Care at [phone] if you'd like a surgery quote.         Call our office if you have any questions! Thank you!         Surgery Letter will be sent via [employer]  After it is reviewed with pt/6/27  Done!

## 2022-07-29 ENCOUNTER — OFFICE VISIT (OUTPATIENT)
Dept: FAMILY MEDICINE | Facility: CLINIC | Age: 32
End: 2022-07-29
Payer: COMMERCIAL

## 2022-07-29 VITALS
HEART RATE: 76 BPM | RESPIRATION RATE: 16 BRPM | DIASTOLIC BLOOD PRESSURE: 85 MMHG | OXYGEN SATURATION: 96 % | SYSTOLIC BLOOD PRESSURE: 117 MMHG | TEMPERATURE: 98.2 F

## 2022-07-29 DIAGNOSIS — Z00.00 ROUTINE GENERAL MEDICAL EXAMINATION AT A HEALTH CARE FACILITY: Primary | ICD-10-CM

## 2022-07-29 DIAGNOSIS — Z11.59 NEED FOR HEPATITIS C SCREENING TEST: ICD-10-CM

## 2022-07-29 DIAGNOSIS — Z13.220 SCREENING FOR LIPID DISORDERS: ICD-10-CM

## 2022-07-29 DIAGNOSIS — Z11.4 SCREENING FOR HIV (HUMAN IMMUNODEFICIENCY VIRUS): ICD-10-CM

## 2022-07-29 PROBLEM — G43.909 MIGRAINE: Status: ACTIVE | Noted: 2017-05-17

## 2022-07-29 PROBLEM — R87.612 PAPANICOLAOU SMEAR OF CERVIX WITH LOW GRADE SQUAMOUS INTRAEPITHELIAL LESION (LGSIL): Status: ACTIVE | Noted: 2017-01-11

## 2022-07-29 PROBLEM — E04.1 THYROID NODULE: Status: ACTIVE | Noted: 2021-12-14

## 2022-07-29 LAB
CHOLEST SERPL-MCNC: 166 MG/DL
FASTING STATUS PATIENT QL REPORTED: YES
FASTING STATUS PATIENT QL REPORTED: YES
GLUCOSE BLD-MCNC: 100 MG/DL (ref 70–99)
HDLC SERPL-MCNC: 61 MG/DL
HGB BLD-MCNC: 13.5 G/DL (ref 11.7–15.7)
HOLD SPECIMEN: NORMAL
LDLC SERPL CALC-MCNC: 93 MG/DL
NONHDLC SERPL-MCNC: 105 MG/DL
TRIGL SERPL-MCNC: 59 MG/DL

## 2022-07-29 PROCEDURE — 99395 PREV VISIT EST AGE 18-39: CPT | Performed by: STUDENT IN AN ORGANIZED HEALTH CARE EDUCATION/TRAINING PROGRAM

## 2022-07-29 PROCEDURE — 82947 ASSAY GLUCOSE BLOOD QUANT: CPT | Performed by: STUDENT IN AN ORGANIZED HEALTH CARE EDUCATION/TRAINING PROGRAM

## 2022-07-29 PROCEDURE — 80061 LIPID PANEL: CPT | Performed by: STUDENT IN AN ORGANIZED HEALTH CARE EDUCATION/TRAINING PROGRAM

## 2022-07-29 PROCEDURE — 87389 HIV-1 AG W/HIV-1&-2 AB AG IA: CPT | Performed by: STUDENT IN AN ORGANIZED HEALTH CARE EDUCATION/TRAINING PROGRAM

## 2022-07-29 PROCEDURE — 85018 HEMOGLOBIN: CPT | Performed by: STUDENT IN AN ORGANIZED HEALTH CARE EDUCATION/TRAINING PROGRAM

## 2022-07-29 PROCEDURE — 86803 HEPATITIS C AB TEST: CPT | Performed by: STUDENT IN AN ORGANIZED HEALTH CARE EDUCATION/TRAINING PROGRAM

## 2022-07-29 PROCEDURE — 36415 COLL VENOUS BLD VENIPUNCTURE: CPT | Performed by: STUDENT IN AN ORGANIZED HEALTH CARE EDUCATION/TRAINING PROGRAM

## 2022-07-29 ASSESSMENT — ENCOUNTER SYMPTOMS
EYE PAIN: 0
CONSTIPATION: 1
FEVER: 0
NERVOUS/ANXIOUS: 1
HEARTBURN: 0
DIARRHEA: 0
CHILLS: 0
JOINT SWELLING: 0
ABDOMINAL PAIN: 0
DIZZINESS: 0
HEADACHES: 0
NAUSEA: 0
SORE THROAT: 0
COUGH: 0
WEAKNESS: 0
MYALGIAS: 0
PARESTHESIAS: 0
BREAST MASS: 0
SHORTNESS OF BREATH: 0
HEMATOCHEZIA: 0
DYSURIA: 0
HEMATURIA: 0
FREQUENCY: 0
ARTHRALGIAS: 0
PALPITATIONS: 0

## 2022-07-29 ASSESSMENT — PATIENT HEALTH QUESTIONNAIRE - PHQ9
10. IF YOU CHECKED OFF ANY PROBLEMS, HOW DIFFICULT HAVE THESE PROBLEMS MADE IT FOR YOU TO DO YOUR WORK, TAKE CARE OF THINGS AT HOME, OR GET ALONG WITH OTHER PEOPLE: SOMEWHAT DIFFICULT
SUM OF ALL RESPONSES TO PHQ QUESTIONS 1-9: 7
SUM OF ALL RESPONSES TO PHQ QUESTIONS 1-9: 7

## 2022-07-29 NOTE — PATIENT INSTRUCTIONS
Saccharomyces boullardii, probiotic.  Formerly [facility], later [facility] in [place], [facility]    Dr. Vazquez or Dr. Garrett.     Preventive Health Recommendations  Female Ages 26 - 39  Yearly exam:   See your health care provider every year in order to    Review health changes.     Discuss preventive care.      Review your medicines if you your doctor has prescribed any.    Until age 30: Get a Pap test every three years (more often if you have had an abnormal result).    After age 30: Talk to your doctor about whether you should have a Pap test every 3 years or have a Pap test with HPV screening every 5 years.   You do not need a Pap test if your uterus was removed (hysterectomy) and you have not had cancer.  You should be tested each year for STDs (sexually transmitted diseases), if you're at risk.   Talk to your provider about how often to have your cholesterol checked.  If you are at risk for diabetes, you should have a diabetes test (fasting glucose).  Shots: Get a flu shot each year. Get a tetanus shot every 10 years.   Nutrition:     Eat at least 5 servings of fruits and vegetables each day.    Eat whole-grain bread, whole-wheat pasta and brown rice instead of white grains and rice.    Get adequate Calcium and Vitamin D.     Lifestyle    Exercise at least 150 minutes a week (30 minutes a day, 5 days of the week). This will help you control your weight and prevent disease.    Limit alcohol to one drink per day.    No smoking.     Wear sunscreen to prevent skin cancer.    See your dentist every six months for an exam and cleaning.    Preventive Health Recommendations  Female Ages 26 - 39  Yearly exam:   See your health care provider every year in order to    Review health changes.     Discuss preventive care.      Review your medicines if you your doctor has prescribed any.    Until age 30: Get a Pap test every three years (more often if you have had an abnormal result).    After age 30: Talk to your doctor about whether you should have a  Pap test every 3 years or have a Pap test with HPV screening every 5 years.   You do not need a Pap test if your uterus was removed (hysterectomy) and you have not had cancer.  You should be tested each year for STDs (sexually transmitted diseases), if you're at risk.   Talk to your provider about how often to have your cholesterol checked.  If you are at risk for diabetes, you should have a diabetes test (fasting glucose).  Shots: Get a flu shot each year. Get a tetanus shot every 10 years.   Nutrition:     Eat at least 5 servings of fruits and vegetables each day.    Eat whole-grain bread, whole-wheat pasta and brown rice instead of white grains and rice.    Get adequate Calcium and Vitamin D.     Lifestyle    Exercise at least 150 minutes a week (30 minutes a day, 5 days of the week). This will help you control your weight and prevent disease.    Limit alcohol to one drink per day.    No smoking.     Wear sunscreen to prevent skin cancer.    See your dentist every six months for an exam and cleaning.
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
## 2022-07-30 LAB
HCV AB SERPL QL IA: NONREACTIVE
HIV 1+2 AB+HIV1 P24 AG SERPL QL IA: NONREACTIVE

## 2022-08-09 ENCOUNTER — OFFICE VISIT (OUTPATIENT)
Dept: FAMILY MEDICINE | Facility: CLINIC | Age: 32
End: 2022-08-09
Payer: COMMERCIAL

## 2022-08-09 VITALS
OXYGEN SATURATION: 100 % | TEMPERATURE: 98.1 F | HEIGHT: 66 IN | DIASTOLIC BLOOD PRESSURE: 80 MMHG | SYSTOLIC BLOOD PRESSURE: 110 MMHG | BODY MASS INDEX: 21.86 KG/M2 | RESPIRATION RATE: 20 BRPM | WEIGHT: 136 LBS | HEART RATE: 100 BPM

## 2022-08-09 DIAGNOSIS — F33.1 MODERATE RECURRENT MAJOR DEPRESSION (H): ICD-10-CM

## 2022-08-09 DIAGNOSIS — E04.1 THYROID NODULE: ICD-10-CM

## 2022-08-09 DIAGNOSIS — K59.00 CONSTIPATION, UNSPECIFIED CONSTIPATION TYPE: ICD-10-CM

## 2022-08-09 DIAGNOSIS — Z01.818 PREOP GENERAL PHYSICAL EXAM: Primary | ICD-10-CM

## 2022-08-09 DIAGNOSIS — F41.1 GAD (GENERALIZED ANXIETY DISORDER): ICD-10-CM

## 2022-08-09 DIAGNOSIS — F50.00 ANOREXIA NERVOSA (H): ICD-10-CM

## 2022-08-09 PROCEDURE — 99214 OFFICE O/P EST MOD 30 MIN: CPT | Performed by: FAMILY MEDICINE

## 2022-08-09 ASSESSMENT — ANXIETY QUESTIONNAIRES
GAD7 TOTAL SCORE: 9
1. FEELING NERVOUS, ANXIOUS, OR ON EDGE: NEARLY EVERY DAY
5. BEING SO RESTLESS THAT IT IS HARD TO SIT STILL: NOT AT ALL
GAD7 TOTAL SCORE: 9
6. BECOMING EASILY ANNOYED OR IRRITABLE: SEVERAL DAYS
GAD7 TOTAL SCORE: 9
7. FEELING AFRAID AS IF SOMETHING AWFUL MIGHT HAPPEN: SEVERAL DAYS
2. NOT BEING ABLE TO STOP OR CONTROL WORRYING: MORE THAN HALF THE DAYS
4. TROUBLE RELAXING: SEVERAL DAYS
8. IF YOU CHECKED OFF ANY PROBLEMS, HOW DIFFICULT HAVE THESE MADE IT FOR YOU TO DO YOUR WORK, TAKE CARE OF THINGS AT HOME, OR GET ALONG WITH OTHER PEOPLE?: SOMEWHAT DIFFICULT
IF YOU CHECKED OFF ANY PROBLEMS ON THIS QUESTIONNAIRE, HOW DIFFICULT HAVE THESE PROBLEMS MADE IT FOR YOU TO DO YOUR WORK, TAKE CARE OF THINGS AT HOME, OR GET ALONG WITH OTHER PEOPLE: SOMEWHAT DIFFICULT
3. WORRYING TOO MUCH ABOUT DIFFERENT THINGS: SEVERAL DAYS
7. FEELING AFRAID AS IF SOMETHING AWFUL MIGHT HAPPEN: SEVERAL DAYS

## 2022-08-09 ASSESSMENT — PATIENT HEALTH QUESTIONNAIRE - PHQ9
10. IF YOU CHECKED OFF ANY PROBLEMS, HOW DIFFICULT HAVE THESE PROBLEMS MADE IT FOR YOU TO DO YOUR WORK, TAKE CARE OF THINGS AT HOME, OR GET ALONG WITH OTHER PEOPLE: SOMEWHAT DIFFICULT
SUM OF ALL RESPONSES TO PHQ QUESTIONS 1-9: 6
SUM OF ALL RESPONSES TO PHQ QUESTIONS 1-9: 6

## 2022-08-09 NOTE — PROGRESS NOTES
----- Message from Rob Mckeon sent at 4/10/2020 10:47 AM EDT -----  Regarding: ARNOL Villanueva/telephone  General Message/Vendor Calls    Caller's first and last name:      Reason for call: The pt would like a call back regarding getting a DR note.       Callback required yes/no and why:yes      Best contact number(s):[phone] [facility] [address] [place] [address]  Phone: [phone]  Primary Provider: No primary care provider on file.  Pre-op Performing Provider: ELADIA WALKER      PREOPERATIVE EVALUATION:  Today's date: 8/9/2022    Janeth Perez is a 32 year old female who presents for a preoperative evaluation.    Surgical Information:  Surgery/Procedure:Thyroid lobectomy  Surgery Location: [facility] services  Surgeon: Dr.Christina Braswell  Surgery Date: 8/16/22  Time of Surgery: 5:30am  Where patient plans to recover: At home with family  Fax number for surgical facility:     Type of Anesthesia Anticipated: unknown    Assessment & Plan     The proposed surgical procedure is considered LOW risk.    Preop general physical exam    Thyroid nodule    Moderate recurrent major depression     HARRISON (generalized anxiety disorder)    Anorexia nervosa  Janeth has not known her weight since completing treatment. She does not want to know her weight as that could trigger her.      Constipation, unspecified constipation type      Risks and Recommendations:  The patient has the following additional risks and recommendations for perioperative complications:   - No identified additional risk factors other than previously addressed    Medication Instructions:  Patient is on no chronic medications    RECOMMENDATION:  APPROVAL GIVEN to proceed with proposed procedure, without further diagnostic evaluation.  UPT on morning of surgery     Subjective     HPI related to upcoming procedure: thyroid nodule -   Per ENT note   Left thyroid nodule that on initial biopsy showed some features raising the question of PTC, but specific staining that followed argued against PTC. The Affirma testing was still pending at her initial evaluation, but has come back now showing a 50% risk of carcinoma    Preop Questions 8/9/2022   1. Have you ever had a heart attack or stroke? No   2. Have you ever had surgery on  your heart or blood vessels, such as a stent placement, a coronary artery bypass, or surgery on an artery in your head, neck, heart, or legs? No   3. Do you have chest pain with activity? No   4. Do you have a history of  heart failure? No   5. Do you currently have a cold, bronchitis or symptoms of other infection? No   6. Do you have a cough, shortness of breath, or wheezing? No   7. Do you or anyone in your family have previous history of blood clots? UNKNOWN - Paternal grandfather unsure if he had clot    8. Do you or does anyone in your family have a serious bleeding problem such as prolonged bleeding following surgeries or cuts? No   9. Have you ever had problems with anemia or been told to take iron pills? YES - h/o anemia and previously used to use iron supplements     10. Have you had any abnormal blood loss such as black, tarry or bloody stools, or abnormal vaginal bleeding? No   11. Have you ever had a blood transfusion? No   12. Are you willing to have a blood transfusion if it is medically needed before, during, or after your surgery? Yes   13. Have you or any of your relatives ever had problems with anesthesia? No   14. Do you have sleep apnea, excessive snoring or daytime drowsiness? Daytime drowsiness, no apnea or snoring    15. Do you have any artifical heart valves or other implanted medical devices like a pacemaker, defibrillator, or continuous glucose monitor? No   16. Do you have artificial joints? No   17. Are you allergic to latex? YES:   18. Is there any chance that you may be pregnant? No, IUD Mirena      Health Care Directive:  Patient does not have a Health Care Directive or Living Will: Discussed advance care planning with patient; information given to patient to review.    Preoperative Review of :   reviewed - no record of controlled substances prescribed.      MDD/HARRISON - Followed by therapist     Status of Chronic Conditions:  See problem list for active medical problems.  Problems  all longstanding and stable, except as noted/documented.  See ROS for pertinent symptoms related to these conditions.      Review of Systems  CONSTITUTIONAL: NEGATIVE for fever, chills, change in weight  INTEGUMENTARY/SKIN: NEGATIVE for worrisome rashes, moles or lesions  EYES: NEGATIVE for vision changes or irritation  ENT/MOUTH: NEGATIVE for ear, mouth and throat problems  RESP: NEGATIVE for significant cough or SOB  CV: NEGATIVE for chest pain, palpitations or peripheral edema  GI: + constipation, NEGATIVE for nausea, abdominal pain, heartburn  : NEGATIVE for frequency, dysuria, or hematuria  MUSCULOSKELETAL: NEGATIVE for significant arthralgias or myalgia  NEURO: NEGATIVE for weakness, dizziness or paresthesias  ENDOCRINE: NEGATIVE for temperature intolerance, skin/hair changes  HEME: NEGATIVE for bleeding problems  PSYCHIATRIC: NEGATIVE for changes in mood or affect    Patient Active Problem List    Diagnosis Date Noted     Thyroid nodule 12/14/2021     Priority: Medium     Added automatically from request for surgery [id number]       Moderate recurrent major depression (H) 10/05/2021     Priority: Medium     HARRISON (generalized anxiety disorder) 10/05/2021     Priority: Medium     History of anemia 10/05/2021     Priority: Medium     External hemorrhoids 10/05/2021     Priority: Medium     Migraine 05/17/2017     Priority: Medium     Stress induced among other things    Formatting of this note might be different from the original.  Better w/ off OCP, now 1x/mo, relieved w/ NSAID       Papanicolaou smear of cervix with low grade squamous intraepithelial lesion (LGSIL) 01/11/2017     Priority: Medium     Formatting of this note might be different from the original.  9/2015 Pap:  LGSIL, repeat 2016, neg, repeat in 3 yrs       Herpes labialis 09/08/2015     Priority: Medium     Formatting of this note might be different from the original.  W/ stress    Last Assessment & Plan:   Formatting of this note might be  different from the original.  Pt to take zovirax prn       Constipation 08/27/2014     Priority: Medium     Formatting of this note might be different from the original.  W/ rectal bleeding, refer to flex sig    Last Assessment & Plan:   Formatting of this note might be different from the original.  Reports chronic constipation, thinks that it is residual in nature. Does not take any medication, only does yoga. Used to take MiraLax in the past, but could not tolerate it.  Also tried fiber supplements which did not help much.      Symptoms suggestive of colon cancer, IBS, or Crohn's disease.    Ordered a sigmoidoscopy. She will receive a call to schedule an appointment. Requested to call the patient after October 14, 2018, since [place].    Advised to take a combination of fiber tablets with plenty of water intake, and take Colace with it. And if she does not have bowel movement for 2 days consecutively, recommended taking Senokot.       History of anorexia nervosa 11/16/2010     Priority: Medium     Formatting of this note might be different from the original.  PT DOES NOT WANT TO KNOW WT , see therapist and psy       Anxiety 11/16/2010     Priority: Medium     Formatting of this note might be different from the original.  See psadis,  On zoloft    Last Assessment & Plan:   Formatting of this note might be different from the original.  Has a h/o anxiety. Is on sertraline. She had stopped taking it for a while, will be resuming it. Follows up with a therapist.      Continue to follow up with the therapist       Headache 01/14/2010     Priority: Medium     Anorexia nervosa 11/07/2006     Priority: Medium     Hyperlipidemia 11/07/2006     Priority: Medium     Iron deficiency anemia 11/07/2006     Priority: Medium     Formatting of this note might be different from the original.  Anemia Iron Deficiency        Past Medical History:   Diagnosis Date     Shingles 2021     Past Surgical History:  "  Procedure Laterality Date     WISDOM TOOTH EXTRACTION  2001     Current Outpatient Medications   Medication Sig Dispense Refill     LORazepam (ATIVAN) 0.5 MG tablet Take 0.5 mg by mouth       valACYclovir (VALTREX) 1000 mg tablet TAKE 1 TABLET BY MOUTH 3 TIMES DAILY FOR 10 DAYS       Zyrtec PRN     Allergies   Allergen Reactions     Penicillins Hives and Other (See Comments)     PN: LW Reaction: Unknown Reaction       Latex Other (See Comments)     swelling   PN: Converted from LW Latex Sensitivity Flag  swelling        Miconazole Rash        Social History     Tobacco Use     Smoking status: Never Smoker     Smokeless tobacco: Never Used   Substance Use Topics     Alcohol use: Yes     Alcohol/week: 3.0 standard drinks     Types: 3 Glasses of wine per week     Family History   Problem Relation Age of Onset     Anxiety Disorder Mother      Skin Cancer Mother      Hypertension Mother      Anemia Mother      Depression Father      Hyperlipidemia Father      Impaired Fasting Glucose Father      Colon Polyps Father         abnormal     Attention Deficit Disorder Brother      Skin Cancer Maternal Grandmother      Vertigo Maternal Grandmother      Alcoholism Maternal Grandfather      Vertigo Maternal Grandfather      Asthma Paternal Grandmother      Diabetes Type 2  Paternal Grandfather      Heart Surgery Paternal Grandfather      Cerebrovascular Disease Paternal Grandfather      History   Drug Use Unknown     Comment: CBD gummies         Objective     LMP  (LMP Unknown)     Physical Exam   /80 (BP Location: Right arm, Patient Position: Sitting, Cuff Size: Adult Regular)   Pulse 100   Temp 98.1  F (36.7  C) (Temporal)   Resp 20   Ht 1.669 m (5' 5.7\")   Wt 61.7 kg (136 lb)   LMP  (LMP Unknown)   SpO2 100%   BMI 22.15 kg/m      GENERAL APPEARANCE: healthy, alert and no distress     EYES: EOMI     HENT: ear canals and TM's normal      RESP: lungs clear to auscultation - no rales, rhonchi or wheezes     CV: " regular rates and rhythm, normal S1 S2     ABDOMEN:  soft, nontender, no HSM or masses and bowel sounds normal     MS: extremities normal- no gross deformities noted, no evidence of inflammation in joints, FROM in all extremities.     SKIN: no suspicious lesions or rashes     NEURO: Normal strength and tone, sensory exam grossly normal, mentation intact and speech normal     PSYCH: mentation appears normal. and affect normal/bright     LYMPHATICS: No cervical adenopathy    Recent Labs   Lab Test 07/29/22  0905   HGB 13.5        Diagnostics:  Recent Results (from the past 720 hour(s))   HIV Antigen Antibody Combo    Collection Time: 07/29/22  9:05 AM   Result Value Ref Range    HIV Antigen Antibody Combo Nonreactive Nonreactive   Hepatitis C Screen Reflex to HCV RNA Quant and Genotype    Collection Time: 07/29/22  9:05 AM   Result Value Ref Range    Hepatitis C Antibody Nonreactive Nonreactive   Glucose    Collection Time: 07/29/22  9:05 AM   Result Value Ref Range    Glucose 100 (H) 70 - 99 mg/dL    Patient Fasting > 8hrs? Yes    Lipid panel reflex to direct LDL Fasting    Collection Time: 07/29/22  9:05 AM   Result Value Ref Range    Cholesterol 166 <200 mg/dL    Triglycerides 59 <150 mg/dL    Direct Measure HDL 61 >=50 mg/dL    LDL Cholesterol Calculated 93 <=100 mg/dL    Non HDL Cholesterol 105 <130 mg/dL    Patient Fasting > 8hrs? Yes    Hemoglobin with Reflex to Iron Studies    Collection Time: 07/29/22  9:05 AM   Result Value Ref Range    Hemoglobin 13.5 11.7 - 15.7 g/dL   Extra Green Top (Lithium Heparin) Tube    Collection Time: 07/29/22  9:05 AM   Result Value Ref Range    Hold Specimen JIC       No EKG required, no history of coronary heart disease, significant arrhythmia, peripheral arterial disease or other structural heart disease.    Revised Cardiac Risk Index (RCRI):  The patient has the following serious cardiovascular risks for perioperative complications:   - No serious cardiac risks = 0 points      RCRI Interpretation: 0 points: Class I (very low risk - 0.4% complication rate)           Signed Electronically by: Kizzy Marquez MD  Copy of this evaluation report is provided to requesting physician.      Answers for HPI/ROS submitted by the patient on 8/9/2022  If you checked off any problems, how difficult have these problems made it for you to do your work, take care of things at home, or get along with other people?: Somewhat difficult  PHQ9 TOTAL SCORE: 6  HARRISON 7 TOTAL SCORE: 9

## 2022-08-09 NOTE — PATIENT INSTRUCTIONS
No Advil one day or Aleve 4 days before surgery.  No Aspirin 7 days before surgery.  Hold all medications on the day of the surgery.        Preparing for Your Surgery  Getting started  A nurse will call you to review your health history and instructions. They will give you an arrival time based on your scheduled surgery time. Please be ready to share:  Your doctor's clinic name and phone number  Your medical, surgical and anesthesia history  A list of allergies and sensitivities  A list of medicines, including herbal treatments and over-the-counter drugs  Whether the patient has a legal guardian (ask how to send us the papers in advance)  Please tell us if you're pregnant--or if there's any chance you might be pregnant. Some surgeries may injure a fetus (unborn baby), so they require a pregnancy test. Surgeries that are safe for a fetus don't always need a test, and you can choose whether to have one.   If you have a child who's having surgery, please ask for a copy of Preparing for Your Child's Surgery.    Preparing for surgery  Within 30 days of surgery: Have a pre-op exam (sometimes called an H&P, or History and Physical). This can be done at a clinic or pre-operative center.  If you're having a , you may not need this exam. Talk to your care team.  At your pre-op exam, talk to your care team about all medicines you take. If you need to stop any medicines before surgery, ask when to start taking them again.  We do this for your safety. Many medicines can make you bleed too much during surgery. Some change how well surgery (anesthesia) drugs work.  Call your insurance company to let them know you're having surgery. (If you don't have insurance, call [phone].)  Call your clinic if there's any change in your health. This includes signs of a cold or flu (sore throat, runny nose, cough, rash, fever). It also includes a scrape or scratch near the surgery site.  If you have questions on the day of surgery,  call your hospital or surgery center.  COVID testing  You may need to be tested for COVID-19 before having surgery. If so, we will give you instructions.  Eating and drinking guidelines  For your safety: Unless your surgeon tells you otherwise, follow the guidelines below.  Eat and drink as usual until 8 hours before surgery. After that, no food or milk.  Drink clear liquids until 2 hours before surgery. These are liquids you can see through, like water, Gatorade and Propel Water. You may also have black coffee and tea (no cream or milk).  Nothing by mouth within 2 hours of surgery. This includes gum, candy and breath mints.  If you drink alcohol: Stop drinking it the night before surgery.  If your care team tells you to take medicine on the morning of surgery, it's okay to take it with a sip of water.  Preventing infection  Shower or bathe the night before and morning of your surgery. Follow the instructions your clinic gave you. (If no instructions, use regular soap.)  Don't shave or clip hair near your surgery site. We'll remove the hair if needed.  Don't smoke or vape the morning of surgery. You may chew nicotine gum up to 2 hours before surgery. A nicotine patch is okay.  Note: Some surgeries require you to completely quit smoking and nicotine. Check with your surgeon.  Your care team will make every effort to keep you safe from infection. We will:  Clean our hands often with soap and water (or an alcohol-based hand rub).  Clean the skin at your surgery site with a special soap that kills germs.  Give you a special gown to keep you warm. (Cold raises the risk of infection.)  Wear special hair covers, masks, gowns and gloves during surgery.  Give antibiotic medicine, if prescribed. Not all surgeries need antibiotics.  What to bring on the day of surgery  Photo ID and insurance card  Copy of your health care directive, if you have one  Glasses and hearing aides (bring cases)  You can't wear contacts during  surgery  Inhaler and eye drops, if you use them (tell us about these when you arrive)  CPAP machine or breathing device, if you use them  A few personal items, if spending the night  If you have . . .  A pacemaker, ICD (cardiac defibrillator) or other implant: Bring the ID card.  An implanted stimulator: Bring the remote control.  A legal guardian: Bring a copy of the certified (court-stamped) guardianship papers.  Please remove any jewelry, including body piercings. Leave jewelry and other valuables at home.  If you're going home the day of surgery  You must have a responsible adult drive you home. They should stay with you overnight as well.  If you don't have someone to stay with you, and you aren't safe to go home alone, we may keep you overnight. Insurance often won't pay for this.  After surgery  If it's hard to control your pain or you need more pain medicine, please call your surgeon's office.  Questions?   If you have any questions for your care team, list them here: _________________________________________________________________________________________________________________________________________________________________________ ____________________________________ ____________________________________ ____________________________________  For informational purposes only. Not to replace the advice of your health care provider. Copyright   2003, 2019 [facility]. All rights reserved. Clinically reviewed by Lizbeth March MD. [employer] [id number] - REV 07/21.

## 2022-08-09 NOTE — H&P (VIEW-ONLY)
[facility] [address] [place] [address]  Phone: [phone]  Primary Provider: No primary care provider on file.  Pre-op Performing Provider: ELADIA WALKER      PREOPERATIVE EVALUATION:  Today's date: 8/9/2022    Janeth Perez is a 32 year old female who presents for a preoperative evaluation.    Surgical Information:  Surgery/Procedure:Thyroid lobectomy  Surgery Location: [facility] services  Surgeon: Dr.Christina Braswell  Surgery Date: 8/16/22  Time of Surgery: 5:30am  Where patient plans to recover: At home with family  Fax number for surgical facility:     Type of Anesthesia Anticipated: unknown    Assessment & Plan     The proposed surgical procedure is considered LOW risk.    Preop general physical exam    Thyroid nodule    Moderate recurrent major depression     HARRISON (generalized anxiety disorder)    Anorexia nervosa  Janeth has not known her weight since completing treatment. She does not want to know her weight as that could trigger her.      Constipation, unspecified constipation type      Risks and Recommendations:  The patient has the following additional risks and recommendations for perioperative complications:   - No identified additional risk factors other than previously addressed    Medication Instructions:  Patient is on no chronic medications    RECOMMENDATION:  APPROVAL GIVEN to proceed with proposed procedure, without further diagnostic evaluation.  UPT on morning of surgery     Subjective     HPI related to upcoming procedure: thyroid nodule -   Per ENT note   Left thyroid nodule that on initial biopsy showed some features raising the question of PTC, but specific staining that followed argued against PTC. The Affirma testing was still pending at her initial evaluation, but has come back now showing a 50% risk of carcinoma    Preop Questions 8/9/2022   1. Have you ever had a heart attack or stroke? No   2. Have you ever had surgery on  your heart or blood vessels, such as a stent placement, a coronary artery bypass, or surgery on an artery in your head, neck, heart, or legs? No   3. Do you have chest pain with activity? No   4. Do you have a history of  heart failure? No   5. Do you currently have a cold, bronchitis or symptoms of other infection? No   6. Do you have a cough, shortness of breath, or wheezing? No   7. Do you or anyone in your family have previous history of blood clots? UNKNOWN - Paternal grandfather unsure if he had clot    8. Do you or does anyone in your family have a serious bleeding problem such as prolonged bleeding following surgeries or cuts? No   9. Have you ever had problems with anemia or been told to take iron pills? YES - h/o anemia and previously used to use iron supplements     10. Have you had any abnormal blood loss such as black, tarry or bloody stools, or abnormal vaginal bleeding? No   11. Have you ever had a blood transfusion? No   12. Are you willing to have a blood transfusion if it is medically needed before, during, or after your surgery? Yes   13. Have you or any of your relatives ever had problems with anesthesia? No   14. Do you have sleep apnea, excessive snoring or daytime drowsiness? Daytime drowsiness, no apnea or snoring    15. Do you have any artifical heart valves or other implanted medical devices like a pacemaker, defibrillator, or continuous glucose monitor? No   16. Do you have artificial joints? No   17. Are you allergic to latex? YES:   18. Is there any chance that you may be pregnant? No, IUD Mirena      Health Care Directive:  Patient does not have a Health Care Directive or Living Will: Discussed advance care planning with patient; information given to patient to review.    Preoperative Review of :   reviewed - no record of controlled substances prescribed.      MDD/HARRISON - Followed by therapist     Status of Chronic Conditions:  See problem list for active medical problems.  Problems  all longstanding and stable, except as noted/documented.  See ROS for pertinent symptoms related to these conditions.      Review of Systems  CONSTITUTIONAL: NEGATIVE for fever, chills, change in weight  INTEGUMENTARY/SKIN: NEGATIVE for worrisome rashes, moles or lesions  EYES: NEGATIVE for vision changes or irritation  ENT/MOUTH: NEGATIVE for ear, mouth and throat problems  RESP: NEGATIVE for significant cough or SOB  CV: NEGATIVE for chest pain, palpitations or peripheral edema  GI: + constipation, NEGATIVE for nausea, abdominal pain, heartburn  : NEGATIVE for frequency, dysuria, or hematuria  MUSCULOSKELETAL: NEGATIVE for significant arthralgias or myalgia  NEURO: NEGATIVE for weakness, dizziness or paresthesias  ENDOCRINE: NEGATIVE for temperature intolerance, skin/hair changes  HEME: NEGATIVE for bleeding problems  PSYCHIATRIC: NEGATIVE for changes in mood or affect    Patient Active Problem List    Diagnosis Date Noted     Thyroid nodule 12/14/2021     Priority: Medium     Added automatically from request for surgery [id number]       Moderate recurrent major depression (H) 10/05/2021     Priority: Medium     HARRISON (generalized anxiety disorder) 10/05/2021     Priority: Medium     History of anemia 10/05/2021     Priority: Medium     External hemorrhoids 10/05/2021     Priority: Medium     Migraine 05/17/2017     Priority: Medium     Stress induced among other things    Formatting of this note might be different from the original.  Better w/ off OCP, now 1x/mo, relieved w/ NSAID       Papanicolaou smear of cervix with low grade squamous intraepithelial lesion (LGSIL) 01/11/2017     Priority: Medium     Formatting of this note might be different from the original.  9/2015 Pap:  LGSIL, repeat 2016, neg, repeat in 3 yrs       Herpes labialis 09/08/2015     Priority: Medium     Formatting of this note might be different from the original.  W/ stress    Last Assessment & Plan:   Formatting of this note might be  different from the original.  Pt to take zovirax prn       Constipation 08/27/2014     Priority: Medium     Formatting of this note might be different from the original.  W/ rectal bleeding, refer to flex sig    Last Assessment & Plan:   Formatting of this note might be different from the original.  Reports chronic constipation, thinks that it is residual in nature. Does not take any medication, only does yoga. Used to take MiraLax in the past, but could not tolerate it.  Also tried fiber supplements which did not help much.      Symptoms suggestive of colon cancer, IBS, or Crohn's disease.    Ordered a sigmoidoscopy. She will receive a call to schedule an appointment. Requested to call the patient after October 14, 2018, [place].    Advised to take a combination of fiber tablets with plenty of water intake, and take Colace with it. And if she does not have bowel movement for 2 days consecutively, recommended taking Senokot.       History of anorexia nervosa 11/16/2010     Priority: Medium     Formatting of this note might be different from the original.  PT DOES NOT WANT TO KNOW WT , see therapist and psy       Anxiety 11/16/2010     Priority: Medium     Formatting of this note might be different from the original.  See psadis,  On zoloft    Last Assessment & Plan:   Formatting of this note might be different from the original.  Has a h/o anxiety. Is on sertraline. She had stopped taking it for a while, will be resuming it. Follows up with a therapist.      Continue to follow up with the therapist       Headache 01/14/2010     Priority: Medium     Anorexia nervosa 11/07/2006     Priority: Medium     Hyperlipidemia 11/07/2006     Priority: Medium     Iron deficiency anemia 11/07/2006     Priority: Medium     Formatting of this note might be different from the original.  Anemia Iron Deficiency        Past Medical History:   Diagnosis Date     Shingles 2021     Past Surgical History:  "  Procedure Laterality Date     WISDOM TOOTH EXTRACTION  2001     Current Outpatient Medications   Medication Sig Dispense Refill     LORazepam (ATIVAN) 0.5 MG tablet Take 0.5 mg by mouth       valACYclovir (VALTREX) 1000 mg tablet TAKE 1 TABLET BY MOUTH 3 TIMES DAILY FOR 10 DAYS       Zyrtec PRN     Allergies   Allergen Reactions     Penicillins Hives and Other (See Comments)     PN: LW Reaction: Unknown Reaction       Latex Other (See Comments)     swelling   PN: Converted from LW Latex Sensitivity Flag  swelling        Miconazole Rash        Social History     Tobacco Use     Smoking status: Never Smoker     Smokeless tobacco: Never Used   Substance Use Topics     Alcohol use: Yes     Alcohol/week: 3.0 standard drinks     Types: 3 Glasses of wine per week     Family History   Problem Relation Age of Onset     Anxiety Disorder Mother      Skin Cancer Mother      Hypertension Mother      Anemia Mother      Depression Father      Hyperlipidemia Father      Impaired Fasting Glucose Father      Colon Polyps Father         abnormal     Attention Deficit Disorder Brother      Skin Cancer Maternal Grandmother      Vertigo Maternal Grandmother      Alcoholism Maternal Grandfather      Vertigo Maternal Grandfather      Asthma Paternal Grandmother      Diabetes Type 2  Paternal Grandfather      Heart Surgery Paternal Grandfather      Cerebrovascular Disease Paternal Grandfather      History   Drug Use Unknown     Comment: CBD gummies         Objective     LMP  (LMP Unknown)     Physical Exam   /80 (BP Location: Right arm, Patient Position: Sitting, Cuff Size: Adult Regular)   Pulse 100   Temp 98.1  F (36.7  C) (Temporal)   Resp 20   Ht 1.669 m (5' 5.7\")   Wt 61.7 kg (136 lb)   LMP  (LMP Unknown)   SpO2 100%   BMI 22.15 kg/m      GENERAL APPEARANCE: healthy, alert and no distress     EYES: EOMI     HENT: ear canals and TM's normal      RESP: lungs clear to auscultation - no rales, rhonchi or wheezes     CV: " regular rates and rhythm, normal S1 S2     ABDOMEN:  soft, nontender, no HSM or masses and bowel sounds normal     MS: extremities normal- no gross deformities noted, no evidence of inflammation in joints, FROM in all extremities.     SKIN: no suspicious lesions or rashes     NEURO: Normal strength and tone, sensory exam grossly normal, mentation intact and speech normal     PSYCH: mentation appears normal. and affect normal/bright     LYMPHATICS: No cervical adenopathy    Recent Labs   Lab Test 07/29/22  0905   HGB 13.5        Diagnostics:  Recent Results (from the past 720 hour(s))   HIV Antigen Antibody Combo    Collection Time: 07/29/22  9:05 AM   Result Value Ref Range    HIV Antigen Antibody Combo Nonreactive Nonreactive   Hepatitis C Screen Reflex to HCV RNA Quant and Genotype    Collection Time: 07/29/22  9:05 AM   Result Value Ref Range    Hepatitis C Antibody Nonreactive Nonreactive   Glucose    Collection Time: 07/29/22  9:05 AM   Result Value Ref Range    Glucose 100 (H) 70 - 99 mg/dL    Patient Fasting > 8hrs? Yes    Lipid panel reflex to direct LDL Fasting    Collection Time: 07/29/22  9:05 AM   Result Value Ref Range    Cholesterol 166 <200 mg/dL    Triglycerides 59 <150 mg/dL    Direct Measure HDL 61 >=50 mg/dL    LDL Cholesterol Calculated 93 <=100 mg/dL    Non HDL Cholesterol 105 <130 mg/dL    Patient Fasting > 8hrs? Yes    Hemoglobin with Reflex to Iron Studies    Collection Time: 07/29/22  9:05 AM   Result Value Ref Range    Hemoglobin 13.5 11.7 - 15.7 g/dL   Extra Green Top (Lithium Heparin) Tube    Collection Time: 07/29/22  9:05 AM   Result Value Ref Range    Hold Specimen JIC       No EKG required, no history of coronary heart disease, significant arrhythmia, peripheral arterial disease or other structural heart disease.    Revised Cardiac Risk Index (RCRI):  The patient has the following serious cardiovascular risks for perioperative complications:   - No serious cardiac risks = 0 points      RCRI Interpretation: 0 points: Class I (very low risk - 0.4% complication rate)           Signed Electronically by: Kizzy Marquez MD  Copy of this evaluation report is provided to requesting physician.      Answers for HPI/ROS submitted by the patient on 8/9/2022  If you checked off any problems, how difficult have these problems made it for you to do your work, take care of things at home, or get along with other people?: Somewhat difficult  PHQ9 TOTAL SCORE: 6  HARRISON 7 TOTAL SCORE: 9

## 2022-08-15 ENCOUNTER — ANESTHESIA EVENT (OUTPATIENT)
Dept: SURGERY | Facility: CLINIC | Age: 32
End: 2022-08-15
Payer: COMMERCIAL

## 2022-08-16 ENCOUNTER — HOSPITAL ENCOUNTER (OUTPATIENT)
Facility: CLINIC | Age: 32
Discharge: HOME | End: 2022-08-16
Attending: OTOLARYNGOLOGY | Admitting: OTOLARYNGOLOGY
Payer: COMMERCIAL

## 2022-08-16 ENCOUNTER — ANESTHESIA (OUTPATIENT)
Dept: SURGERY | Facility: CLINIC | Age: 32
End: 2022-08-16
Payer: COMMERCIAL

## 2022-08-16 VITALS
TEMPERATURE: 99 F | BODY MASS INDEX: 22.66 KG/M2 | SYSTOLIC BLOOD PRESSURE: 118 MMHG | OXYGEN SATURATION: 98 % | HEIGHT: 65 IN | RESPIRATION RATE: 16 BRPM | WEIGHT: 136 LBS | HEART RATE: 79 BPM | DIASTOLIC BLOOD PRESSURE: 80 MMHG

## 2022-08-16 DIAGNOSIS — E04.1 THYROID NODULE: Primary | ICD-10-CM

## 2022-08-16 PROCEDURE — 250N000011 HC RX IP 250 OP 636

## 2022-08-16 PROCEDURE — 250N000009 HC RX 250

## 2022-08-16 PROCEDURE — 250N000025 HC SEVOFLURANE, PER MIN: Performed by: OTOLARYNGOLOGY

## 2022-08-16 PROCEDURE — 710N000012 HC RECOVERY PHASE 2, PER MINUTE: Performed by: OTOLARYNGOLOGY

## 2022-08-16 PROCEDURE — 88307 TISSUE EXAM BY PATHOLOGIST: CPT | Mod: TC | Performed by: OTOLARYNGOLOGY

## 2022-08-16 PROCEDURE — 250N000013 HC RX MED GY IP 250 OP 250 PS 637: Performed by: OTOLARYNGOLOGY

## 2022-08-16 PROCEDURE — 360N000076 HC SURGERY LEVEL 3, PER MIN: Performed by: OTOLARYNGOLOGY

## 2022-08-16 PROCEDURE — 250N000011 HC RX IP 250 OP 636: Performed by: OTOLARYNGOLOGY

## 2022-08-16 PROCEDURE — 370N000017 HC ANESTHESIA TECHNICAL FEE, PER MIN: Performed by: OTOLARYNGOLOGY

## 2022-08-16 PROCEDURE — 258N000003 HC RX IP 258 OP 636: Performed by: ANESTHESIOLOGY

## 2022-08-16 PROCEDURE — 250N000009 HC RX 250: Performed by: OTOLARYNGOLOGY

## 2022-08-16 PROCEDURE — 88342 IMHCHEM/IMCYTCHM 1ST ANTB: CPT | Mod: 26 | Performed by: PATHOLOGY

## 2022-08-16 PROCEDURE — 88342 IMHCHEM/IMCYTCHM 1ST ANTB: CPT | Mod: TC | Performed by: OTOLARYNGOLOGY

## 2022-08-16 PROCEDURE — 999N000141 HC STATISTIC PRE-PROCEDURE NURSING ASSESSMENT: Performed by: OTOLARYNGOLOGY

## 2022-08-16 PROCEDURE — 88307 TISSUE EXAM BY PATHOLOGIST: CPT | Mod: 26 | Performed by: PATHOLOGY

## 2022-08-16 PROCEDURE — 250N000011 HC RX IP 250 OP 636: Performed by: ANESTHESIOLOGY

## 2022-08-16 PROCEDURE — 60220 PARTIAL REMOVAL OF THYROID: CPT | Mod: LT | Performed by: OTOLARYNGOLOGY

## 2022-08-16 PROCEDURE — 272N000001 HC OR GENERAL SUPPLY STERILE: Performed by: OTOLARYNGOLOGY

## 2022-08-16 PROCEDURE — 710N000010 HC RECOVERY PHASE 1, LEVEL 2, PER MIN: Performed by: OTOLARYNGOLOGY

## 2022-08-16 PROCEDURE — 88341 IMHCHEM/IMCYTCHM EA ADD ANTB: CPT | Mod: 26 | Performed by: PATHOLOGY

## 2022-08-16 PROCEDURE — 250N000013 HC RX MED GY IP 250 OP 250 PS 637: Performed by: ANESTHESIOLOGY

## 2022-08-16 RX ORDER — LIDOCAINE 40 MG/G
CREAM TOPICAL
Status: DISCONTINUED | OUTPATIENT
Start: 2022-08-16 | End: 2022-08-16 | Stop reason: HOSPADM

## 2022-08-16 RX ORDER — KETAMINE HYDROCHLORIDE 10 MG/ML
INJECTION INTRAMUSCULAR; INTRAVENOUS PRN
Status: DISCONTINUED | OUTPATIENT
Start: 2022-08-16 | End: 2022-08-16

## 2022-08-16 RX ORDER — ACETAMINOPHEN 325 MG/1
975 TABLET ORAL ONCE
Status: COMPLETED | OUTPATIENT
Start: 2022-08-16 | End: 2022-08-16

## 2022-08-16 RX ORDER — IBUPROFEN 600 MG/1
600 TABLET, FILM COATED ORAL EVERY 6 HOURS PRN
Qty: 28 TABLET | Refills: 0 | Status: SHIPPED | OUTPATIENT
Start: 2022-08-16 | End: 2022-08-23

## 2022-08-16 RX ORDER — PROPOFOL 10 MG/ML
INJECTION, EMULSION INTRAVENOUS CONTINUOUS PRN
Status: DISCONTINUED | OUTPATIENT
Start: 2022-08-16 | End: 2022-08-16

## 2022-08-16 RX ORDER — ONDANSETRON 4 MG/1
4 TABLET, ORALLY DISINTEGRATING ORAL EVERY 30 MIN PRN
Status: DISCONTINUED | OUTPATIENT
Start: 2022-08-16 | End: 2022-08-16 | Stop reason: HOSPADM

## 2022-08-16 RX ORDER — PROPOFOL 10 MG/ML
INJECTION, EMULSION INTRAVENOUS PRN
Status: DISCONTINUED | OUTPATIENT
Start: 2022-08-16 | End: 2022-08-16

## 2022-08-16 RX ORDER — SODIUM CHLORIDE, SODIUM LACTATE, POTASSIUM CHLORIDE, CALCIUM CHLORIDE 600; 310; 30; 20 MG/100ML; MG/100ML; MG/100ML; MG/100ML
INJECTION, SOLUTION INTRAVENOUS CONTINUOUS
Status: DISCONTINUED | OUTPATIENT
Start: 2022-08-16 | End: 2022-08-16 | Stop reason: HOSPADM

## 2022-08-16 RX ORDER — FENTANYL CITRATE 50 UG/ML
50 INJECTION, SOLUTION INTRAMUSCULAR; INTRAVENOUS
Status: DISCONTINUED | OUTPATIENT
Start: 2022-08-16 | End: 2022-08-16 | Stop reason: HOSPADM

## 2022-08-16 RX ORDER — OXYCODONE HYDROCHLORIDE 5 MG/1
5 TABLET ORAL EVERY 4 HOURS PRN
Status: DISCONTINUED | OUTPATIENT
Start: 2022-08-16 | End: 2022-08-16 | Stop reason: HOSPADM

## 2022-08-16 RX ORDER — HYDROMORPHONE HCL IN WATER/PF 6 MG/30 ML
0.4 PATIENT CONTROLLED ANALGESIA SYRINGE INTRAVENOUS EVERY 5 MIN PRN
Status: DISCONTINUED | OUTPATIENT
Start: 2022-08-16 | End: 2022-08-16 | Stop reason: HOSPADM

## 2022-08-16 RX ORDER — MEPERIDINE HYDROCHLORIDE 25 MG/ML
12.5 INJECTION INTRAMUSCULAR; INTRAVENOUS; SUBCUTANEOUS
Status: DISCONTINUED | OUTPATIENT
Start: 2022-08-16 | End: 2022-08-16 | Stop reason: HOSPADM

## 2022-08-16 RX ORDER — DEXAMETHASONE SODIUM PHOSPHATE 10 MG/ML
INJECTION, SOLUTION INTRAMUSCULAR; INTRAVENOUS PRN
Status: DISCONTINUED | OUTPATIENT
Start: 2022-08-16 | End: 2022-08-16

## 2022-08-16 RX ORDER — ALBUTEROL SULFATE 0.83 MG/ML
2.5 SOLUTION RESPIRATORY (INHALATION) EVERY 4 HOURS PRN
Status: DISCONTINUED | OUTPATIENT
Start: 2022-08-16 | End: 2022-08-16 | Stop reason: HOSPADM

## 2022-08-16 RX ORDER — LIDOCAINE HYDROCHLORIDE 10 MG/ML
INJECTION, SOLUTION INFILTRATION; PERINEURAL PRN
Status: DISCONTINUED | OUTPATIENT
Start: 2022-08-16 | End: 2022-08-16

## 2022-08-16 RX ORDER — CLINDAMYCIN PHOSPHATE 900 MG/50ML
900 INJECTION, SOLUTION INTRAVENOUS
Status: COMPLETED | OUTPATIENT
Start: 2022-08-16 | End: 2022-08-16

## 2022-08-16 RX ORDER — FENTANYL CITRATE 50 UG/ML
50 INJECTION, SOLUTION INTRAMUSCULAR; INTRAVENOUS EVERY 5 MIN PRN
Status: DISCONTINUED | OUTPATIENT
Start: 2022-08-16 | End: 2022-08-16 | Stop reason: HOSPADM

## 2022-08-16 RX ORDER — LABETALOL HYDROCHLORIDE 5 MG/ML
10 INJECTION, SOLUTION INTRAVENOUS
Status: DISCONTINUED | OUTPATIENT
Start: 2022-08-16 | End: 2022-08-16 | Stop reason: HOSPADM

## 2022-08-16 RX ORDER — ONDANSETRON 2 MG/ML
4 INJECTION INTRAMUSCULAR; INTRAVENOUS EVERY 30 MIN PRN
Status: DISCONTINUED | OUTPATIENT
Start: 2022-08-16 | End: 2022-08-16 | Stop reason: HOSPADM

## 2022-08-16 RX ORDER — HALOPERIDOL 5 MG/ML
1 INJECTION INTRAMUSCULAR
Status: DISCONTINUED | OUTPATIENT
Start: 2022-08-16 | End: 2022-08-16 | Stop reason: HOSPADM

## 2022-08-16 RX ORDER — MAGNESIUM HYDROXIDE 1200 MG/15ML
LIQUID ORAL PRN
Status: DISCONTINUED | OUTPATIENT
Start: 2022-08-16 | End: 2022-08-16 | Stop reason: HOSPADM

## 2022-08-16 RX ORDER — TRAMADOL HYDROCHLORIDE 50 MG/1
50 TABLET ORAL EVERY 6 HOURS PRN
Qty: 10 TABLET | Refills: 0 | Status: SHIPPED | OUTPATIENT
Start: 2022-08-16 | End: 2022-08-21

## 2022-08-16 RX ORDER — ONDANSETRON 2 MG/ML
INJECTION INTRAMUSCULAR; INTRAVENOUS PRN
Status: DISCONTINUED | OUTPATIENT
Start: 2022-08-16 | End: 2022-08-16

## 2022-08-16 RX ORDER — FENTANYL CITRATE 50 UG/ML
INJECTION, SOLUTION INTRAMUSCULAR; INTRAVENOUS PRN
Status: DISCONTINUED | OUTPATIENT
Start: 2022-08-16 | End: 2022-08-16

## 2022-08-16 RX ADMIN — ROCURONIUM BROMIDE 20 MG: 10 INJECTION, SOLUTION INTRAVENOUS at 08:00

## 2022-08-16 RX ADMIN — SODIUM CHLORIDE, POTASSIUM CHLORIDE, SODIUM LACTATE AND CALCIUM CHLORIDE: 600; 310; 30; 20 INJECTION, SOLUTION INTRAVENOUS at 09:44

## 2022-08-16 RX ADMIN — MIDAZOLAM 2 MG: 1 INJECTION INTRAMUSCULAR; INTRAVENOUS at 07:30

## 2022-08-16 RX ADMIN — CLINDAMYCIN PHOSPHATE 900 MG: 900 INJECTION, SOLUTION INTRAVENOUS at 06:44

## 2022-08-16 RX ADMIN — ROCURONIUM BROMIDE 20 MG: 10 INJECTION, SOLUTION INTRAVENOUS at 07:39

## 2022-08-16 RX ADMIN — SODIUM CHLORIDE, POTASSIUM CHLORIDE, SODIUM LACTATE AND CALCIUM CHLORIDE: 600; 310; 30; 20 INJECTION, SOLUTION INTRAVENOUS at 06:44

## 2022-08-16 RX ADMIN — ONDANSETRON 4 MG: 2 INJECTION INTRAMUSCULAR; INTRAVENOUS at 07:39

## 2022-08-16 RX ADMIN — PROPOFOL 150 MCG/KG/MIN: 10 INJECTION, EMULSION INTRAVENOUS at 07:39

## 2022-08-16 RX ADMIN — DEXAMETHASONE SODIUM PHOSPHATE 10 MG: 10 INJECTION, SOLUTION INTRAMUSCULAR; INTRAVENOUS at 07:39

## 2022-08-16 RX ADMIN — OXYCODONE HYDROCHLORIDE 5 MG: 5 TABLET ORAL at 10:26

## 2022-08-16 RX ADMIN — FENTANYL CITRATE 50 MCG: 50 INJECTION, SOLUTION INTRAMUSCULAR; INTRAVENOUS at 09:31

## 2022-08-16 RX ADMIN — KETAMINE HYDROCHLORIDE 20 MG: 10 INJECTION, SOLUTION INTRAMUSCULAR; INTRAVENOUS at 08:03

## 2022-08-16 RX ADMIN — IBUPROFEN 600 MG: 400 TABLET ORAL at 10:26

## 2022-08-16 RX ADMIN — PROPOFOL 140 MG: 10 INJECTION, EMULSION INTRAVENOUS at 07:39

## 2022-08-16 RX ADMIN — FENTANYL CITRATE 50 MCG: 50 INJECTION, SOLUTION INTRAMUSCULAR; INTRAVENOUS at 07:39

## 2022-08-16 RX ADMIN — FENTANYL CITRATE 50 MCG: 50 INJECTION, SOLUTION INTRAMUSCULAR; INTRAVENOUS at 09:15

## 2022-08-16 RX ADMIN — SUGAMMADEX 200 MG: 100 INJECTION, SOLUTION INTRAVENOUS at 08:54

## 2022-08-16 RX ADMIN — ACETAMINOPHEN 975 MG: 325 TABLET ORAL at 06:49

## 2022-08-16 RX ADMIN — FENTANYL CITRATE 50 MCG: 50 INJECTION, SOLUTION INTRAMUSCULAR; INTRAVENOUS at 07:57

## 2022-08-16 RX ADMIN — LIDOCAINE HYDROCHLORIDE 40 MG: 10 INJECTION, SOLUTION INFILTRATION; PERINEURAL at 07:39

## 2022-08-16 ASSESSMENT — ACTIVITIES OF DAILY LIVING (ADL)
ADLS_ACUITY_SCORE: 35

## 2022-08-16 NOTE — ANESTHESIA CARE TRANSFER NOTE
Patient: Janeth Perez    Procedure: Procedure(s):  THYROID LOBECTOMY, LEFT       Diagnosis: Thyroid nodule [E04.1]  Diagnosis Additional Information: No value filed.    Anesthesia Type:   General     Note:    Oropharynx: oropharynx clear of all foreign objects and spontaneously breathing  Level of Consciousness: awake  Oxygen Supplementation: face mask  Level of Supplemental Oxygen (L/min / FiO2): 8  Independent Airway: airway patency satisfactory and stable  Dentition: dentition unchanged  Vital Signs Stable: post-procedure vital signs reviewed and stable  Report to RN Given: handoff report given  Patient transferred to: PACU    Handoff Report: Identifed the Patient, Identified the Reponsible Provider, Reviewed the pertinent medical history, Discussed the surgical course, Reviewed Intra-OP anesthesia mangement and issues during anesthesia, Set expectations for post-procedure period and Allowed opportunity for questions and acknowledgement of understanding      Vitals:  Vitals Value Taken Time   /81 08/16/22 0906   Temp 36.4  C (97.6  F) 08/16/22 0906   Pulse 94 08/16/22 0908   Resp 12 08/16/22 0908   SpO2 100 % 08/16/22 0908   Vitals shown include unvalidated device data.    Electronically Signed By: Joshua Mccray  August 16, 2022  9:10 AM

## 2022-08-16 NOTE — ANESTHESIA PROCEDURE NOTES
Airway       Patient location during procedure: OR       Procedure Start/Stop Times: 8/16/2022 7:41 AM  Staff -        Anesthesiologist:  Luiz Bernard MD       CRNA: Elda Ron APRN CRNA       Other Anesthesia Staff: Joshua Mccray       Performed By: SRNA  Consent for Airway        Urgency: elective  Indications and Patient Condition       Indications for airway management: cady-procedural       Induction type:intravenous       Mask difficulty assessment: 2 - vent by mask + OA or adjuvant +/- NMBA    Final Airway Details       Final airway type: endotracheal airway       Successful airway: ETT - single  Endotracheal Airway Details        ETT size (mm): 7.0       Cuffed: yes       Successful intubation technique: direct laryngoscopy       DL Blade Type: Ruiz 2       Grade View of Cords: 1       Adjucts: stylet and tooth guard       Position: Right       Measured from: gums/teeth       Secured at (cm): 22    Post intubation assessment        Placement verified by: capnometry, equal breath sounds and chest rise        Number of attempts at approach: 1       Secured with: silk tape       Ease of procedure: easy       Dentition: Intact and Unchanged    Medication(s) Administered   Medication Administration Time: 8/16/2022 7:41 AM

## 2022-08-16 NOTE — OP NOTE
Otolaryngology Full Operative Report    Date of Operation:  8/16/22    Pre-operative Diagnosis:  Left thyroid and isthmus nodules  Post-operative Diagnosis:  same  Procedure(s):  Left thyroid lobectomy and isthmusectomy    Surgeon: Sandra Freedman MD  Assistant(s):  Wilmer Funk MD (for critical retraction and assistance)  Anesthesia:  GET    Indications for Procedure:  Janeth is a 33yo F. The risks and the benefits of the procedure were discussed with the patient. A consent form was then signed and placed into the chart.    Procedure in Detail:  The patient was brought into the room and placed on the table in a supine position. Anesthesia successfully intubated without any difficulties. The patient was then prepped and draped in the usual, sterile fashion. A time out was performed with all pertinent personnel present. A 4cm incision was made [facility] between the cricoid cartilage and the sternal notch with a #15 blade. The incision was carried down through the subcutaneous tissues and platysma with a Bovie cautery. Superior and inferior skin flaps were raised in the subplatysmal plane. Self-retaining fish hook retractors were placed under the skin flaps. The midline raphe was divided to the level of the thyroid gland. Attention was turned toward the left side first. The strap muscles were elevated and retracted laterally. The medial and lateral tunnels were identified with relation to the superior pole. The pole vessels were isolated, divided, and suture ligated. The superior parathyroid gland was identified and dissected away from the thyroid, taking care to maintain its innate blood supply. The gland was then rolled medially over the trachea to expose the tracheoesophageal groove. Careful dissection allowed for the separation of the inferior parathyroid gland from the thyroid and visualization of the recurrent laryngeal nerve.  The gland was dissected away from the nerve; Berry's ligament was divided, and a cautery  was used to take the thyroid gland off the anterior tracheal wall. Care was taken to ensure that no thyroid tissue was left in the area of Berry's ligament. The inferior pole vessels were ligated and divided.  The gland was removed from the anterior tracheal wall just beyond the midline to the right to make sure the isthmus was taken with the specimen.    Hemostasis was achieved and a postage stamp sized piece of surgicel was placed over the nerve. The strap muscles were reapproximated in the midline using a 4-0 monocryl.  The subdermal tissues and platysma were closed using interrupted 4-0 monocryls. Finally, Dermabond was used to approximate the skin edges. The patient was then given back to anesthesia for emergence.    Findings:   1. Left inferior thyroid nodule, isthmus nodule  2. Intact RLN  3. 2 left parathyroids intact    Specimen(s):  Left thyroid    EBL:  5cc    Complications:  none    Disposition: The patient was extubated in the operating room and was transferred to the PACU in stable condition.     Sandra Freedman MD  [facility]  [phone] (o)

## 2022-08-16 NOTE — ANESTHESIA PREPROCEDURE EVALUATION
Anesthesia Pre-Procedure Evaluation    Patient: Janeth Perez   MRN: [id number] : 1990        Procedure : Procedure(s):  THYROID LOBECTOMY, LEFT          Past Medical History:   Diagnosis Date     Shin2021      Past Surgical History:   Procedure Laterality Date     WISDOM TOOTH EXTRACTION        Allergies   Allergen Reactions     Penicillins Hives and Other (See Comments)     PN: LW Reaction: Unknown Reaction       Latex Other (See Comments)     swelling   PN: Converted from LW Latex Sensitivity Flag  swelling        Miconazole Rash      Social History     Tobacco Use     Smoking status: Never Smoker     Smokeless tobacco: Never Used   Substance Use Topics     Alcohol use: Yes     Alcohol/week: 3.0 standard drinks     Types: 3 Glasses of wine per week      Wt Readings from Last 1 Encounters:   22 61.7 kg (136 lb)        Anesthesia Evaluation            ROS/MED HX  ENT/Pulmonary:  - neg pulmonary ROS  (-) asthma   Neurologic:     (+) migraines,     Cardiovascular: Comment: History of bradycardia with eating disorder. HR in 80s this am.  - neg cardiovascular ROS     METS/Exercise Tolerance:     Hematologic:       Musculoskeletal:       GI/Hepatic:  - neg GI/hepatic ROS     Renal/Genitourinary:  - neg Renal ROS     Endo:     (+) thyroid problem,     Psychiatric/Substance Use:     (+) psychiatric history (anorexia nervosa) anxiety and depression     Infectious Disease: Comment: HSV      Malignancy:       Other:            Physical Exam    Airway        Mallampati: I   TM distance: > 3 FB   Neck ROM: full   Mouth opening: > 3 cm    Respiratory Devices and Support         Dental  no notable dental history         Cardiovascular          Rhythm and rate: regular and normal     Pulmonary           breath sounds clear to auscultation           OUTSIDE LABS:  CBC:   Lab Results   Component Value Date    HGB 13.5 2022     BMP:   Lab Results   Component Value Date     (H) 2022      COAGS: No results found for: PTT, INR, FIBR  POC: No results found for: BGM, HCG, HCGS  HEPATIC: No results found for: ALBUMIN, PROTTOTAL, ALT, AST, GGT, ALKPHOS, BILITOTAL, BILIDIRECT, ANIYA  OTHER: No results found for: PH, LACT, A1C, BELIA, PHOS, MAG, LIPASE, AMYLASE, TSH, T4, T3, CRP, SED    Anesthesia Plan    ASA Status:  3   NPO Status:  NPO Appropriate    Anesthesia Type: General.     - Airway: ETT   Induction: Intravenous, Propofol.           Consents    Anesthesia Plan(s) and associated risks, benefits, and realistic alternatives discussed. Questions answered and patient/representative(s) expressed understanding.    - Discussed:     - Discussed with:  Patient      - Extended Intubation/Ventilatory Support Discussed: No.      - Patient is DNR/DNI Status: No    Use of blood products discussed: No .     Postoperative Care       PONV prophylaxis: Background Propofol Infusion, Dexamethasone or Solumedrol, Ondansetron (or other 5HT-3)     Comments:    Other Comments: GETA, have NIMs tube available. [facility] for NIMs if needed. Background propofol. Dexamethasone, ondansetron for nausea. I offered the patient a pregnancy test and she declined.             Dany Mehta MD

## 2022-08-16 NOTE — ANESTHESIA POSTPROCEDURE EVALUATION
Patient: Janeth Perez    Procedure: Procedure(s):  THYROID LOBECTOMY, LEFT       Anesthesia Type:  General    Note:  Disposition: Outpatient   Postop Pain Control: Uneventful            Sign Out: Well controlled pain   PONV: No   Neuro/Psych: Uneventful            Sign Out: Acceptable/Baseline neuro status   Airway/Respiratory: Uneventful            Sign Out: Acceptable/Baseline resp. status   CV/Hemodynamics: Uneventful            Sign Out: Acceptable CV status; No obvious hypovolemia; No obvious fluid overload   Other NRE: NONE   DID A NON-ROUTINE EVENT OCCUR? No           Last vitals:  Vitals Value Taken Time   /80 08/16/22 1010   Temp 36.6  C (97.9  F) 08/16/22 0930   Pulse 81 08/16/22 1001   Resp 16 08/16/22 1010   SpO2 99 % 08/16/22 1010   Vitals shown include unvalidated device data.    Electronically Signed By: Luiz Bernard MD  August 16, 2022  11:10 AM

## 2022-08-23 NOTE — OR NURSING
Patient called today c/o red itchy rash /hives all over chest neck back of neck and top of shoulders. Some are raised red and some have white headed tops.  She states it is not acne.  She states she started to itch after she was cleansed with chao.  Patient was calling to get Dr Wagoner telephone number, to report this reaction.  Encouraged also to call her primary doctor also.

## 2022-08-24 ENCOUNTER — OFFICE VISIT (OUTPATIENT)
Dept: OTOLARYNGOLOGY | Facility: CLINIC | Age: 32
End: 2022-08-24
Payer: COMMERCIAL

## 2022-08-24 ENCOUNTER — TELEPHONE (OUTPATIENT)
Dept: OTOLARYNGOLOGY | Facility: CLINIC | Age: 32
End: 2022-08-24

## 2022-08-24 DIAGNOSIS — E04.1 THYROID NODULE: ICD-10-CM

## 2022-08-24 DIAGNOSIS — R21 RASH: Primary | ICD-10-CM

## 2022-08-24 PROCEDURE — 99024 POSTOP FOLLOW-UP VISIT: CPT | Performed by: OTOLARYNGOLOGY

## 2022-08-24 RX ORDER — METHYLPREDNISOLONE 4 MG
TABLET, DOSE PACK ORAL
Qty: 21 TABLET | Refills: 0 | Status: SHIPPED | OUTPATIENT
Start: 2022-08-24 | End: 2023-02-20

## 2022-08-24 RX ORDER — ESCITALOPRAM OXALATE 5 MG/1
TABLET ORAL
COMMUNITY
Start: 2022-02-25 | End: 2023-02-20

## 2022-08-24 NOTE — LETTER
8/24/2022         RE: Janeth Perez  [address] [place] [address]        Dear Colleague,    Thank you for referring your patient, Janeth Perez, to the [facility]. Please see a copy of my visit note below.    HPI: This patient is a 31yo F who presents for a post-op visit s/p thyroid lobectomy.  Doing well overall. Having minor discomfort associated with the neck musculature. No wound issues, swallowing problems, or voice changes. Denies muscle cramps and parasthesias. She is having a rash all over her body. It started several days ago and seems to be spreading. She remembers using a chao wipe on her skin the day of surgery and it burned a lot when she used it.     PHYSICAL EXAMINATION:  GEN: no acute distress, normocephalic  OC/OP: clear, no masses or lesions.   NECK: soft and supple. No lymphadenopathy or masses. Airway is midline.   PULM: breathing comfortably on room air, normal chest expansion with respiration    PATHOLOGY:  pending    MEDICAL DECISION-MAKING: doing well s/p left thyroid lobectomy. Will be okay to resume full activity. Will call with pathology when available. 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 Will try a medrol dose pack.         Again, thank you for allowing me to participate in the care of your patient.        Sincerely,        Sandra Freedman MD

## 2022-08-24 NOTE — TELEPHONE ENCOUNTER
Patient's PO appt was bumped today and she talked to a nurse yesterday about the recaction she is having from the ointment and was told it would be addressed today at her appt. She has a rash across her face, neck, shoulders and chest.    Please call her ASAP to get her in to see Dr. Freedman.    Thanks!

## 2022-08-24 NOTE — TELEPHONE ENCOUNTER
Spoke with Janeth and her rash is still noticeable. She did try taking the Benadryl but it sort of worked.  Got pt rescheduled or a post-op follow up visit at [facility] clinic.    [facility]      Leyla Choudhury RN  [facility]  ENT  [address] [place] [id number]  [email or web address]   Office:[phone]  Employed by [facility]

## 2022-08-24 NOTE — PROGRESS NOTES
HPI: This patient is a 33yo F who presents for a post-op visit s/p thyroid lobectomy.  Doing well overall. Having minor discomfort associated with the neck musculature. No wound issues, swallowing problems, or voice changes. Denies muscle cramps and parasthesias. She is having a rash all over her body. It started several days ago and seems to be spreading. She remembers using a chao wipe on her skin the day of surgery and it burned a lot when she used it.     PHYSICAL EXAMINATION:  GEN: no acute distress, normocephalic  OC/OP: clear, no masses or lesions.   NECK: soft and supple. No lymphadenopathy or masses. Airway is midline.   PULM: breathing comfortably on room air, normal chest expansion with respiration    PATHOLOGY:  pending    MEDICAL DECISION-MAKING: doing well s/p left thyroid lobectomy. Will be okay to resume full activity. Will call with pathology when available. 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 Will try a medrol dose pack.

## 2022-09-13 LAB
PATH REPORT.COMMENTS IMP SPEC: NORMAL
PATH REPORT.FINAL DX SPEC: NORMAL
PATH REPORT.GROSS SPEC: NORMAL
PATH REPORT.MICROSCOPIC SPEC OTHER STN: NORMAL
PATH REPORT.RELEVANT HX SPEC: NORMAL
PHOTO IMAGE: NORMAL

## 2022-10-15 ENCOUNTER — HEALTH MAINTENANCE LETTER (OUTPATIENT)
Age: 32
End: 2022-10-15

## 2022-10-20 ENCOUNTER — IMMUNIZATION (OUTPATIENT)
Dept: NURSING | Facility: CLINIC | Age: 32
End: 2022-10-20
Payer: COMMERCIAL

## 2022-10-20 PROCEDURE — 90471 IMMUNIZATION ADMIN: CPT

## 2022-10-20 PROCEDURE — 91312 COVID-19,PF,PFIZER BOOSTER BIVALENT: CPT

## 2022-10-20 PROCEDURE — 90686 IIV4 VACC NO PRSV 0.5 ML IM: CPT

## 2022-10-20 PROCEDURE — 0124A COVID-19,PF,PFIZER BOOSTER BIVALENT: CPT

## 2023-01-09 ENCOUNTER — TELEPHONE (OUTPATIENT)
Dept: FAMILY MEDICINE | Facility: CLINIC | Age: 33
End: 2023-01-09

## 2023-01-09 DIAGNOSIS — Z11.3 ROUTINE SCREENING FOR STI (SEXUALLY TRANSMITTED INFECTION): Primary | ICD-10-CM

## 2023-01-09 NOTE — TELEPHONE ENCOUNTER
General Call      Reason for Call:  Has questions regarding appointment    What are your questions or concerns:  Patient asking to come in to be tested for STD due to a condom breaking. She wants to know why she needs to be seen when clearly it's just a labs that need to be done.     Date of last appointment with provider: pt was seen for a pre-op exam on 8/9/2022 and physical on 7/29/2022    Could we send this information to you in [facility] or would you prefer to receive a phone call?:   Patient would prefer a phone call   Okay to leave a detailed message?: Yes at Cell number on file:    Telephone Information:   Mobile [phone]

## 2023-01-09 NOTE — TELEPHONE ENCOUNTER
Dr. Marquez-    See message below    Would you be willing to order STD panel with an E-Visit? Or does pt need to be evaluated in person?    ISABELA AndrewN RN  [facility]

## 2023-01-10 NOTE — TELEPHONE ENCOUNTER
Dr. Marquez-Please review and sign if agree.    1. Patient requested lab orders for chlamydia, gonorrhea, HIV, syphillis and defers to you on whether Hepatitis B/C should be rechecked   A. Condom broke during intercourse on 12/31/22 and patient starts fertility treatment next week so would like to be retested   B. Had these labs checked on 12/16/22 with her fertility clinic and they were negative.  Does not want to ask fertility clinic for retesting in case this affects her timeline for fertility treatment    Called patient who stated lab letter order is for significant other was previously with [facility] and establishing care with a new PCP in February 2023; he has not been evaluated by Dr. Marquez/[facility].  Informed patient Dr. Marquez/clinic providers would not order any labs for a patient they have never evaluated.  Significant other will need to seek lab orders from previous PCP or perhaps Urgent Care.    Offered patient a virtual visit with WILL Paige CNP, today for lab orders, which patient declined.  Informed patient message will be sent to Dr. Marquez requesting labs and there is a chance Dr. Marquez will say a visit is needed.  Patient verbalized understanding and in agreement with plan.  Patient scheduled an appointment with Dr. Tim on 1/16/23 in case a visit is  Needed.    Patient denied any symptoms, currently.      Thank you!  ISABELA WangN, RN-[facility]

## 2023-01-10 NOTE — TELEPHONE ENCOUNTER
Writer called patient and reviewed message per Dr. Marquez.    Patient verbalized understanding and asked questions as to why provider visit needed.  Writer explained reasoning to the best of writer's knowledge and encouraged patient to discuss any further questions regarding why a visit is recommended with Dr. Tim on 1/16/22.    ISABELA WangN, RN-[facility]

## 2023-01-10 NOTE — TELEPHONE ENCOUNTER
Patient called to check status on request. Was not aware PCP was not in office. Discussed that PCP is in today, but seeing patients and would most likely start addressing requests midday/end of day. Per patient request is time sensitive. She is starting fertility treatment next Tuesday and her boyfriend needs a full STD/HIV as well as Hep B/C lab work done today or tomorrow. He is between primary providers and his home clinic is requesting a letter from patients(Janeth) PCP in order for them to obtain the blood work from her partner.  Partners name Lamont Marroquindelia KWOK 1988 Fax [phone]

## 2023-02-20 ENCOUNTER — OFFICE VISIT (OUTPATIENT)
Dept: FAMILY MEDICINE | Facility: CLINIC | Age: 33
End: 2023-02-20
Payer: COMMERCIAL

## 2023-02-20 VITALS
BODY MASS INDEX: 21.91 KG/M2 | HEART RATE: 87 BPM | HEIGHT: 64 IN | TEMPERATURE: 97.7 F | RESPIRATION RATE: 13 BRPM | SYSTOLIC BLOOD PRESSURE: 129 MMHG | WEIGHT: 128.3 LBS | DIASTOLIC BLOOD PRESSURE: 79 MMHG | OXYGEN SATURATION: 98 %

## 2023-02-20 DIAGNOSIS — Z01.818 PREOP GENERAL PHYSICAL EXAM: Primary | ICD-10-CM

## 2023-02-20 DIAGNOSIS — Z86.2 HISTORY OF ANEMIA: ICD-10-CM

## 2023-02-20 PROBLEM — E04.1 THYROID NODULE: Status: RESOLVED | Noted: 2021-12-14 | Resolved: 2023-02-20

## 2023-02-20 PROCEDURE — 99214 OFFICE O/P EST MOD 30 MIN: CPT | Performed by: NURSE PRACTITIONER

## 2023-02-20 RX ORDER — MUPIROCIN 20 MG/G
OINTMENT TOPICAL
COMMUNITY
Start: 2022-12-08 | End: 2023-02-20

## 2023-02-20 RX ORDER — SULFACETAMIDE SODIUM, SULFUR 100; 50 MG/G; MG/G
EMULSION TOPICAL
COMMUNITY
Start: 2022-10-01

## 2023-02-20 RX ORDER — FLUCONAZOLE 150 MG/1
TABLET ORAL
Qty: 1 TABLET | Refills: 0 | COMMUNITY
Start: 2023-02-20

## 2023-02-20 RX ORDER — KETOCONAZOLE 20 MG/ML
SHAMPOO TOPICAL
COMMUNITY
Start: 2022-09-27

## 2023-02-20 ASSESSMENT — PATIENT HEALTH QUESTIONNAIRE - PHQ9
SUM OF ALL RESPONSES TO PHQ QUESTIONS 1-9: 4
10. IF YOU CHECKED OFF ANY PROBLEMS, HOW DIFFICULT HAVE THESE PROBLEMS MADE IT FOR YOU TO DO YOUR WORK, TAKE CARE OF THINGS AT HOME, OR GET ALONG WITH OTHER PEOPLE: SOMEWHAT DIFFICULT
SUM OF ALL RESPONSES TO PHQ QUESTIONS 1-9: 4

## 2023-02-20 ASSESSMENT — PAIN SCALES - GENERAL: PAINLEVEL: NO PAIN (0)

## 2023-02-20 NOTE — ASSESSMENT & PLAN NOTE
Off antidepressants at this time.  Feels depression is well controlled with use of therapy, exercise, and diet

## 2023-02-20 NOTE — PATIENT INSTRUCTIONS
For informational purposes only. Not to replace the advice of your health care provider. Copyright   2003,  [employer] [facility]. All rights reserved. Clinically reviewed by Lizbeth March MD. [employer] [id number] - REV .  Preparing for Your Surgery  Getting started  A nurse will call you to review your health history and instructions. They will give you an arrival time based on your scheduled surgery time. Please be ready to share:    Your doctor's clinic name and phone number    Your medical, surgical, and anesthesia history    A list of allergies and sensitivities    A list of medicines, including herbal treatments and over-the-counter drugs    Whether the patient has a legal guardian (ask how to send us the papers in advance)  Please tell us if you're pregnant--or if there's any chance you might be pregnant. Some surgeries may injure a fetus (unborn baby), so they require a pregnancy test. Surgeries that are safe for a fetus don't always need a test, and you can choose whether to have one.   If you have a child who's having surgery, please ask for a copy of Preparing for Your Child's Surgery.    Preparing for surgery    Within 10 to 30 days of surgery: Have a pre-op exam (sometimes called an H&P, or History and Physical). This can be done at a clinic or pre-operative center.  ? If you're having a , you may not need this exam. Talk to your care team.    At your pre-op exam, talk to your care team about all medicines you take. If you need to stop any medicines before surgery, ask when to start taking them again.  ? We do this for your safety. Many medicines can make you bleed too much during surgery. Some change how well surgery (anesthesia) drugs work.    Call your insurance company to let them know you're having surgery. (If you don't have insurance, call [phone].)    Call your clinic if there's any change in your health. This includes signs of a cold or flu (sore throat, runny nose,  cough, rash, fever). It also includes a scrape or scratch near the surgery site.    If you have questions on the day of surgery, call your hospital or surgery center.  Eating and drinking guidelines  For your safety: Unless your surgeon tells you otherwise, follow the guidelines below.    Eat and drink as usual until 8 hours before you arrive for surgery. After that, no food or milk.    Drink clear liquids until 2 hours before you arrive. These are liquids you can see through, like water, Gatorade, and Propel Water. They also include plain black coffee and tea (no cream or milk), candy, and breath mints. You can spit out gum when you arrive.    If you drink alcohol: Stop drinking it the night before surgery.    If your care team tells you to take medicine on the morning of surgery, it's okay to take it with a sip of water.  Preventing infection    Shower or bathe the night before and morning of your surgery. Follow the instructions your clinic gave you. (If no instructions, use regular soap.)    Don't shave or clip hair near your surgery site. We'll remove the hair if needed.    Don't smoke or vape the morning of surgery. You may chew nicotine gum up to 2 hours before surgery. A nicotine patch is okay.  ? Note: Some surgeries require you to completely quit smoking and nicotine. Check with your surgeon.    Your care team will make every effort to keep you safe from infection. We will:  ? Clean our hands often with soap and water (or an alcohol-based hand rub).  ? Clean the skin at your surgery site with a special soap that kills germs.  ? Give you a special gown to keep you warm. (Cold raises the risk of infection.)  ? Wear special hair covers, masks, gowns and gloves during surgery.  ? Give antibiotic medicine, if prescribed. Not all surgeries need antibiotics.  What to bring on the day of surgery    Photo ID and insurance card    Copy of your health care directive, if you have one    Glasses and hearing aids (bring  cases)  ? You can't wear contacts during surgery    Inhaler and eye drops, if you use them (tell us about these when you arrive)    CPAP machine or breathing device, if you use them    A few personal items, if spending the night    If you have . . .  ? A pacemaker, ICD (cardiac defibrillator) or other implant: Bring the ID card.  ? An implanted stimulator: Bring the remote control.  ? A legal guardian: Bring a copy of the certified (court-stamped) guardianship papers.  Please remove any jewelry, including body piercings. Leave jewelry and other valuables at home.  If you're going home the day of surgery    You must have a responsible adult drive you home. They should stay with you overnight as well.    If you don't have someone to stay with you, and you aren't safe to go home alone, we may keep you overnight. Insurance often won't pay for this.  After surgery  If it's hard to control your pain or you need more pain medicine, please call your surgeon's office.  Questions?   If you have any questions for your care team, list them here: _________________________________________________________________________________________________________________________________________________________________________ ____________________________________ ____________________________________ ____________________________________

## 2023-02-20 NOTE — PROGRESS NOTES
[facility]  [address] [facility] [address]  Phone: [phone]  Primary Provider: Kizzy Marquez  Pre-op Performing Provider: RENETTA NUÑEZ      PREOPERATIVE EVALUATION:  Today's date: 2/20/2023    Answers for HPI/ROS submitted by the patient on 2/20/2023  If you checked off any problems, how difficult have these problems made it for you to do your work, take care of things at home, or get along with other people?: Somewhat difficult  PHQ9 TOTAL SCORE: 4          Janeth Perez is a 32 year old female who presents for a preoperative evaluation.    Surgical Information:  Surgery/Procedure: IVF retrieval   Surgery Location: [facility]  Surgeon: Shaista Jones  Surgery Date: 3/10/23  Time of Surgery: TBD  Where patient plans to recover: At home with family  Fax number for surgical facility: [phone]    Type of Anesthesia Anticipated: General    Assessment & Plan     The proposed surgical procedure is considered INTERMEDIATE risk.      ICD-10-CM    1. Preop general physical exam  Z01.818       2. History of anemia  Z86.2              Risks and Recommendations:  The patient has the following additional risks and recommendations for perioperative complications:   - No identified additional risk factors other than previously addressed    Medication Instructions:  Patient is to take all scheduled medications on the day of surgery    RECOMMENDATION:  APPROVAL GIVEN to proceed with proposed procedure, without further diagnostic evaluation.    Subjective     HPI related to upcoming procedure: Desires preservation of viable eggs.  Undergoing procedure for retrieval.    Preop Questions 2/20/2023   1. Have you ever had a heart attack or stroke? No   2. Have you ever had surgery on your heart or blood vessels, such as a stent placement, a coronary artery bypass, or surgery on an artery in your head, neck, heart, or legs? No   3. Do you have chest pain with activity? No   4.  Do you have a history of  heart failure? No   5. Do you currently have a cold, bronchitis or symptoms of other infection? YES - Cold:  COVID testing negative times 2   6. Do you have a cough, shortness of breath, or wheezing? YES - Cough and congestion, no shortness of breath   7. Do you or anyone in your family have previous history of blood clots? UNKNOWN - n/a   8. Do you or does anyone in your family have a serious bleeding problem such as prolonged bleeding following surgeries or cuts? No   9. Have you ever had problems with anemia or been told to take iron pills? YES - because of an eating disorder when she was younger   10. Have you had any abnormal blood loss such as black, tarry or bloody stools, or abnormal vaginal bleeding? No   11. Have you ever had a blood transfusion? No   12. Are you willing to have a blood transfusion if it is medically needed before, during, or after your surgery? Yes   13. Have you or any of your relatives ever had problems with anesthesia? UNKNOWN - not to her knowledge. Last time she was under she got an allergic reaction to the Niles wipe on her mouth but not the anaesthesia that anyone told her about   14. Do you have sleep apnea, excessive snoring or daytime drowsiness? No   15. Do you have any artifical heart valves or other implanted medical devices like a pacemaker, defibrillator, or continuous glucose monitor? No   16. Do you have artificial joints? No   17. Are you allergic to latex? YES: lip swelling   18. Is there any chance that you may be pregnant? No       Health Care Directive:  Patient does not have a Health Care Directive or Living Will: Discussed advance care planning with patient; however, patient declined at this time.    Preoperative Review of :   reviewed - Tramadol ordered times one in the last year as expected post surgery      Status of Chronic Conditions:  See problem list for active medical problems.  Problems all longstanding and stable, except as  noted/documented.  See ROS for pertinent symptoms related to these conditions.      Review of Systems  CONSTITUTIONAL: NEGATIVE for fever, chills, change in weight  INTEGUMENTARY/SKIN: NEGATIVE for worrisome rashes, moles or lesions  EYES: NEGATIVE for vision changes or irritation  ENT/MOUTH: recent -- negative  COVID, no fever or chills, or signicant cough  RESP: NEGATIVE for significant cough or SOB  CV: NEGATIVE for chest pain, palpitations or peripheral edema  GI: NEGATIVE for nausea, abdominal pain, heartburn, or change in bowel habits  : NEGATIVE for frequency, dysuria, or hematuria  MUSCULOSKELETAL: NEGATIVE for significant arthralgias or myalgia  NEURO: NEGATIVE for weakness, dizziness or paresthesias  ENDOCRINE: NEGATIVE for temperature intolerance, skin/hair changes  HEME: NEGATIVE for bleeding problems  PSYCHIATRIC: NEGATIVE for changes in mood or affect    Patient Active Problem List    Diagnosis Date Noted     Moderate recurrent major depression (H) 10/05/2021     Priority: Medium     HARRISON (generalized anxiety disorder) 10/05/2021     Priority: Medium     History of anemia 10/05/2021     Priority: Medium     Iron deficiency       External hemorrhoids 10/05/2021     Priority: Medium     Migraine 05/17/2017     Priority: Medium     Stress induced among other things    Formatting of this note might be different from the original.  Better w/ off OCP, now 1x/mo, relieved w/ NSAID       Papanicolaou smear of cervix with low grade squamous intraepithelial lesion (LGSIL) 01/11/2017     Priority: Medium     Formatting of this note might be different from the original.  9/2015 Pap:  LGSIL, repeat 2016, neg, repeat in 3 yrs       Herpes labialis 09/08/2015     Priority: Medium     Formatting of this note might be different from the original.  W/ stress    Last Assessment & Plan:   Formatting of this note might be different from the original.  Pt to take zovirax prn       Constipation 08/27/2014     Priority:  Medium     Formatting of this note might be different from the original.  W/ rectal bleeding, refer to flex sig    Last Assessment & Plan:   Formatting of this note might be different from the original.  Reports chronic constipation, thinks that it is residual in nature. Does not take any medication, only does yoga. Used to take MiraLax in the past, but could not tolerate it.  Also tried fiber supplements which did not help much.      Symptoms suggestive of colon cancer, IBS, or Crohn's disease.    Ordered a sigmoidoscopy. She will receive a call to schedule an appointment. Requested to call the patient after October 14, 2018, [place].    Advised to take a combination of fiber tablets with plenty of water intake, and take Colace with it. And if she does not have bowel movement for 2 days consecutively, recommended taking Senokot.       History of anorexia nervosa 11/16/2010     Priority: Medium     Formatting of this note might be different from the original.  PT DOES NOT WANT TO KNOW WT , see therapist and psy        Past Medical History:   Diagnosis Date     Depressive disorder      Heart disease     Bradychardia     Shingles 2021     Past Surgical History:   Procedure Laterality Date     BIOPSY      HPV RELATED Bio psy     COLONOSCOPY      Turned out to hemmorhoids     ENT SURGERY  08/16/22    Removal of 2/3 of thyroid due to suspicious nodule that ended uo being benign     THYROIDECTOMY Left 08/16/2022    Procedure: THYROID LOBECTOMY, LEFT;  Surgeon: Sandra Freedman MD;  Location: [facility] Main OR     WISDOM TOOTH EXTRACTION  2001     Current Outpatient Medications   Medication Sig Dispense Refill     fluconazole (DIFLUCAN) 150 MG tablet Take by mouth every 14 days Once every two weeks. 1 tablet 0     ketoconazole (NIZORAL) 2 % external shampoo APPLY AS A BODY WASH EVERY OTHER SHOWER. LET SIT FOR 3-4 MINUTES BEFORE RINSING.       Sulfacetamide Sodium-Sulfur 10-5 % LIQD  "WASH AFFECTED AREAS EVERY OTHER DAY IN THE SHOWER       LORazepam (ATIVAN) 0.5 MG tablet Take 0.5 mg by mouth every 6 hours as needed (Patient not taking: Reported on 2/20/2023)       valACYclovir (VALTREX) 1000 mg tablet daily as needed (Patient not taking: Reported on 2/20/2023)         Allergies   Allergen Reactions     Penicillins Hives and Other (See Comments)     PN: LW Reaction: Unknown Reaction       Other Drug Allergy (See Comments) Rash     Niles Wipes--folliculitis and ongoing rash for over 6 months     Latex Other (See Comments)     swelling   PN: Converted from LW Latex Sensitivity Flag  swelling        Miconazole Rash        Social History     Tobacco Use     Smoking status: Never     Smokeless tobacco: Never   Substance Use Topics     Alcohol use: Yes     Alcohol/week: 3.0 standard drinks     Types: 3 Glasses of wine per week       History   Drug Use Unknown     Comment: CBD gummies         Objective     /79 (BP Location: Right arm, Patient Position: Sitting, Cuff Size: Adult Regular)   Pulse 87   Temp 97.7  F (36.5  C) (Oral)   Resp 13   Ht 1.626 m (5' 4\")   Wt 58.2 kg (128 lb 4.8 oz)   LMP  (LMP Unknown)   SpO2 98%   BMI 22.02 kg/m      Physical Exam    GENERAL APPEARANCE: healthy, alert and no distress     EYES: EOMI, PERRL     HENT: ear canals and TM's normal and nose and mouth without ulcers or lesions     NECK: no adenopathy, no asymmetry, masses, or scars and thyroid normal to palpation     RESP: lungs clear to auscultation - no rales, rhonchi or wheezes     CV: regular rates and rhythm, normal S1 S2, no S3 or S4 and no murmur, click or rub     ABDOMEN:  soft, nontender, no HSM or masses and bowel sounds normal     MS: extremities normal- no gross deformities noted, no evidence of inflammation in joints, FROM in all extremities.     NEURO: Normal strength and tone, sensory exam grossly normal, mentation intact and speech normal     PSYCH: mentation appears normal. and affect " normal/bright     LYMPHATICS: No cervical adenopathy     SKIN- minimal evidence today of folliculitis on chest.      Recent Labs   Lab Test 07/29/22  0905   HGB 13.5        Diagnostics:  No labs were ordered during this visit.   No EKG required, no history of coronary heart disease, significant arrhythmia, peripheral arterial disease or other structural heart disease.    Revised Cardiac Risk Index (RCRI):  The patient has the following serious cardiovascular risks for perioperative complications:   - No serious cardiac risks = 0 points     RCRI Interpretation: 0 points: Class I (very low risk - 0.4% complication rate)           Signed Electronically by: MOJGAN Sanders CNP  Copy of this evaluation report is provided to requesting physician.

## 2023-05-08 DIAGNOSIS — B00.1 COLD SORE: Primary | ICD-10-CM

## 2023-05-08 RX ORDER — VALACYCLOVIR HYDROCHLORIDE 1 G/1
2000 TABLET, FILM COATED ORAL 2 TIMES DAILY
Qty: 4 TABLET | Refills: 1 | Status: SHIPPED | OUTPATIENT
Start: 2023-05-08 | End: 2023-05-09

## 2023-05-08 RX ORDER — VALACYCLOVIR HYDROCHLORIDE 1 G/1
TABLET, FILM COATED ORAL DAILY PRN
Status: CANCELLED | OUTPATIENT
Start: 2023-05-08

## 2023-05-08 NOTE — TELEPHONE ENCOUNTER
Dr. Marquez-Please review and sign if agree.      Call received from patient stating she needs refill of Valtrex for a cold sore:  1. Last time took this medication took 2000 mg BID per outbreak but notes from clinic visit are incorrect on dosage    Pharmacy confirmed with patient.    Informed patient request will be sent to Dr. Marquez.    Patient verbalized understanding and in agreement with plan.    Routing refill request to provider for review/approval because:  Medication is reported/historical    Last visit with Dr. Marquez: Annual physical on 7/29/22.      Thank you!  ZEE Will, BSN, RN-[facility]

## 2023-05-08 NOTE — TELEPHONE ENCOUNTER
Left message for patient:  1. Valtrex at requested dose was sent to [facility] in [facility]    ISABELA WangN, RN-BC  [facility]

## 2023-05-08 NOTE — TELEPHONE ENCOUNTER
Patient asking for Valacyclovir refill, hasn't had in a couple of years.  Takes 1000 mg twice daily for 1 day with outbreaks (face). MADELINE Johnson R.N.

## 2023-06-09 ENCOUNTER — OFFICE VISIT (OUTPATIENT)
Dept: FAMILY MEDICINE | Facility: CLINIC | Age: 33
End: 2023-06-09
Payer: COMMERCIAL

## 2023-06-09 VITALS
RESPIRATION RATE: 16 BRPM | HEART RATE: 60 BPM | TEMPERATURE: 97.7 F | BODY MASS INDEX: 22.02 KG/M2 | HEIGHT: 64 IN | SYSTOLIC BLOOD PRESSURE: 128 MMHG | DIASTOLIC BLOOD PRESSURE: 79 MMHG | OXYGEN SATURATION: 100 %

## 2023-06-09 DIAGNOSIS — Z01.818 PREOP GENERAL PHYSICAL EXAM: ICD-10-CM

## 2023-06-09 DIAGNOSIS — F33.42 RECURRENT MAJOR DEPRESSIVE DISORDER, IN FULL REMISSION (H): ICD-10-CM

## 2023-06-09 DIAGNOSIS — F41.1 GAD (GENERALIZED ANXIETY DISORDER): ICD-10-CM

## 2023-06-09 DIAGNOSIS — H61.21 IMPACTED CERUMEN OF RIGHT EAR: ICD-10-CM

## 2023-06-09 PROCEDURE — 69209 REMOVE IMPACTED EAR WAX UNI: CPT | Mod: RT | Performed by: FAMILY MEDICINE

## 2023-06-09 PROCEDURE — 99213 OFFICE O/P EST LOW 20 MIN: CPT | Mod: 25 | Performed by: FAMILY MEDICINE

## 2023-06-09 ASSESSMENT — PAIN SCALES - GENERAL: PAINLEVEL: NO PAIN (0)

## 2023-06-09 NOTE — NURSING NOTE
Patient identified using two patient identifiers.  Ear exam showing wax occlusion completed by provider.  Solution: warm water was placed in the right ear(s) via irrigation tool: elephant ear       Silvia Servni MA  .

## 2023-06-09 NOTE — PATIENT INSTRUCTIONS
For informational purposes only. Not to replace the advice of your health care provider. Copyright   2003,  [facility]. All rights reserved. Clinically reviewed by Lizbeth March MD. [employer] [id number] - REV .  Preparing for Your Surgery  Getting started  A nurse will call you to review your health history and instructions. They will give you an arrival time based on your scheduled surgery time. Please be ready to share:  Your doctor's clinic name and phone number  Your medical, surgical, and anesthesia history  A list of allergies and sensitivities  A list of medicines, including herbal treatments and over-the-counter drugs  Whether the patient has a legal guardian (ask how to send us the papers in advance)  Please tell us if you're pregnant--or if there's any chance you might be pregnant. Some surgeries may injure a fetus (unborn baby), so they require a pregnancy test. Surgeries that are safe for a fetus don't always need a test, and you can choose whether to have one.   If you have a child who's having surgery, please ask for a copy of Preparing for Your Child's Surgery.    Preparing for surgery  Within 10 to 30 days of surgery: Have a pre-op exam (sometimes called an H&P, or History and Physical). This can be done at a clinic or pre-operative center.  If you're having a , you may not need this exam. Talk to your care team.  At your pre-op exam, talk to your care team about all medicines you take. If you need to stop any medicines before surgery, ask when to start taking them again.  We do this for your safety. Many medicines can make you bleed too much during surgery. Some change how well surgery (anesthesia) drugs work.  Call your insurance company to let them know you're having surgery. (If you don't have insurance, call [phone].)  Call your clinic if there's any change in your health. This includes signs of a cold or flu (sore throat, runny nose, cough, rash, fever). It  also includes a scrape or scratch near the surgery site.  If you have questions on the day of surgery, call your hospital or surgery center.  Eating and drinking guidelines  For your safety: Unless your surgeon tells you otherwise, follow the guidelines below.  Eat and drink as usual until 8 hours before you arrive for surgery. After that, no food or milk.  Drink clear liquids until 2 hours before you arrive. These are liquids you can see through, like water, Gatorade, and Propel Water. They also include plain black coffee and tea (no cream or milk), candy, and breath mints. You can spit out gum when you arrive.  If you drink alcohol: Stop drinking it the night before surgery.  If your care team tells you to take medicine on the morning of surgery, it's okay to take it with a sip of water.  Preventing infection  Shower or bathe the night before and morning of your surgery. Follow the instructions your clinic gave you. (If no instructions, use regular soap.)  Don't shave or clip hair near your surgery site. We'll remove the hair if needed.  Don't smoke or vape the morning of surgery. You may chew nicotine gum up to 2 hours before surgery. A nicotine patch is okay.  Note: Some surgeries require you to completely quit smoking and nicotine. Check with your surgeon.  Your care team will make every effort to keep you safe from infection. We will:  Clean our hands often with soap and water (or an alcohol-based hand rub).  Clean the skin at your surgery site with a special soap that kills germs.  Give you a special gown to keep you warm. (Cold raises the risk of infection.)  Wear special hair covers, masks, gowns and gloves during surgery.  Give antibiotic medicine, if prescribed. Not all surgeries need antibiotics.  What to bring on the day of surgery  Photo ID and insurance card  Copy of your health care directive, if you have one  Glasses and hearing aids (bring cases)  You can't wear contacts during surgery  Inhaler and  eye drops, if you use them (tell us about these when you arrive)  CPAP machine or breathing device, if you use them  A few personal items, if spending the night  If you have . . .  A pacemaker, ICD (cardiac defibrillator) or other implant: Bring the ID card.  An implanted stimulator: Bring the remote control.  A legal guardian: Bring a copy of the certified (court-stamped) guardianship papers.  Please remove any jewelry, including body piercings. Leave jewelry and other valuables at home.  If you're going home the day of surgery  You must have a responsible adult drive you home. They should stay with you overnight as well.  If you don't have someone to stay with you, and you aren't safe to go home alone, we may keep you overnight. Insurance often won't pay for this.  After surgery  If it's hard to control your pain or you need more pain medicine, please call your surgeon's office.  Questions?   If you have any questions for your care team, list them here: _________________________________________________________________________________________________________________________________________________________________________ ____________________________________ ____________________________________ ____________________________________    How to Take Your Medication Before Surgery  - HOLD (do not take) Aspirin for 7 days, Aleve for 4 days and Advil for 1 day   - STOP taking all vitamins and herbal supplements 14 days before surgery.

## 2023-06-09 NOTE — PROGRESS NOTES
[address]  [address]  [place] [address]  Phone: [phone]  Fax: [phone]  Primary Provider: Kizzy Marquez  Pre-op Performing Provider: KIZZY MARQUEZ      PREOPERATIVE EVALUATION:  Today's date: 6/9/2023    Janeth Perez is a 32 year old female who presents for a preoperative evaluation.      6/9/2023    11:27 AM   Additional Questions   Roomed by Regan Anderson   Accompanied by self     Surgical Information:  Surgery/Procedure:  Egg retrieveal   Surgery Location: [facility]  Surgeon: Heladio Velásquez   Surgery Date: 06/21/2023 TBD   Time of Surgery: 10:00AM  Where patient plans to recover: At home with family  Fax number for surgical facility: [phone]    Assessment & Plan     The proposed surgical procedure is considered LOW risk.      1. Preop general physical exam    2. Impacted cerumen of right ear  - REMOVE IMPACTED CERUMEN    3. Recurrent major depressive disorder, in full remission (H)    4. HARRISON (generalized anxiety disorder)       - No identified additional risk factors other than previously addressed    Antiplatelet or Anticoagulation Medication Instructions:   - Patient is on no antiplatelet or anticoagulation medications.    Additional Medication Instructions:  Patient is on no additional chronic medications    RECOMMENDATION:  APPROVAL GIVEN to proceed with proposed procedure, without further diagnostic evaluation.        Subjective       HPI related to upcoming procedure: egg retrieval         6/8/2023    11:44 AM   Preop Questions   1. Have you ever had a heart attack or stroke? No   2. Have you ever had surgery on your heart or blood vessels, such as a stent placement, a coronary artery bypass, or surgery on an artery in your head, neck, heart, or legs? No   3. Do you have chest pain with activity? No   4. Do you have a history of  heart failure? No   5. Do you currently have a cold, bronchitis or symptoms of other infection? No   6.  Do you have a cough, shortness of breath, or wheezing? No   7. Do you or anyone in your family have previous history of blood clots? UNKNOWN -  Paternal grandfather unsure if he had clot    8. Do you or does anyone in your family have a serious bleeding problem such as prolonged bleeding following surgeries or cuts? No   9. Have you ever had problems with anemia or been told to take iron pills? YES - h/o anemia and previously used to use iron supplements     10. Have you had any abnormal blood loss such as black, tarry or bloody stools, or abnormal vaginal bleeding? No   11. Have you ever had a blood transfusion? No   12. Are you willing to have a blood transfusion if it is medically needed before, during, or after your surgery? Yes   13. Have you or any of your relatives ever had problems with anesthesia? No   14. Do you have sleep apnea, excessive snoring or daytime drowsiness? No   15. Do you have any artifical heart valves or other implanted medical devices like a pacemaker, defibrillator, or continuous glucose monitor? No   16. Do you have artificial joints? No   17. Are you allergic to latex? YES   18. Is there any chance that you may be pregnant? No       Health Care Directive:  Patient does not have a Health Care Directive or Living Will: previously given in clinic     Preoperative Review of :   reviewed - no record of controlled substances prescribed.      Status of Chronic Conditions:  See problem list for active medical problems.  Problems all longstanding and stable, except as noted/documented.  See ROS for pertinent symptoms related to these conditions.      Review of Systems  CONSTITUTIONAL: NEGATIVE for fever, chills, change in weight  INTEGUMENTARY/SKIN: NEGATIVE for worrisome rashes, moles or lesions  EYES: NEGATIVE for vision changes or irritation  ENT/MOUTH: NEGATIVE for ear, mouth and throat problems  RESP: NEGATIVE for significant cough or SOB  CV: NEGATIVE for chest pain, palpitations or  peripheral edema  GI: + constipation, NEGATIVE for nausea, abdominal pain, heartburn  : NEGATIVE for frequency, dysuria, or hematuria  MUSCULOSKELETAL: NEGATIVE for significant arthralgias or myalgia  NEURO: + intermittent migraines 2/2 stress, NEGATIVE for weakness, dizziness or paresthesias  ENDOCRINE: NEGATIVE for temperature intolerance, skin/hair changes  HEME: NEGATIVE for bleeding problems  PSYCHIATRIC: NEGATIVE for changes in mood or affect    Patient Active Problem List    Diagnosis Date Noted     Moderate recurrent major depression (H) 10/05/2021     Priority: Medium     HARRISON (generalized anxiety disorder) 10/05/2021     Priority: Medium     History of anemia 10/05/2021     Priority: Medium     Iron deficiency       External hemorrhoids 10/05/2021     Priority: Medium     Migraine 05/17/2017     Priority: Medium     Stress induced among other things    Formatting of this note might be different from the original.  Better w/ off OCP, now 1x/mo, relieved w/ NSAID       Papanicolaou smear of cervix with low grade squamous intraepithelial lesion (LGSIL) 01/11/2017     Priority: Medium     Formatting of this note might be different from the original.  9/2015 Pap:  LGSIL, repeat 2016, neg, repeat in 3 yrs       Herpes labialis 09/08/2015     Priority: Medium     Formatting of this note might be different from the original.  W/ stress    Last Assessment & Plan:   Formatting of this note might be different from the original.  Pt to take zovirax prn       Constipation 08/27/2014     Priority: Medium     Formatting of this note might be different from the original.  W/ rectal bleeding, refer to flex sig    Last Assessment & Plan:   Formatting of this note might be different from the original.  Reports chronic constipation, thinks that it is residual in nature. Does not take any medication, only does yoga. Used to take MiraLax in the past, but could not tolerate it.  Also tried fiber supplements which did not help  much.      Symptoms suggestive of colon cancer, IBS, or Crohn's disease.    Ordered a sigmoidoscopy. She will receive a call to schedule an appointment. Requested to call the patient after October 14, 2018, [place].    Advised to take a combination of fiber tablets with plenty of water intake, and take Colace with it. And if she does not have bowel movement for 2 days consecutively, recommended taking Senokot.       History of anorexia nervosa 11/16/2010     Priority: Medium     Formatting of this note might be different from the original.  PT DOES NOT WANT TO KNOW WT , see therapist and psy        Past Medical History:   Diagnosis Date     Depressive disorder      Heart disease     Bradychardia     Shingles 2021     Past Surgical History:   Procedure Laterality Date     BIOPSY      HPV RELATED Bio psy     COLONOSCOPY      Turned out to hemmorhoids     ENT SURGERY  08/16/22    Removal of 2/3 of thyroid due to suspicious nodule that ended uo being benign     THYROIDECTOMY Left 08/16/2022    Procedure: THYROID LOBECTOMY, LEFT;  Surgeon: Sandra Freedman MD;  Location: [facility] OR     WISDOM TOOTH EXTRACTION  2001     Current Outpatient Medications   Medication     fluconazole (DIFLUCAN) 150 MG tablet     ketoconazole (NIZORAL) 2 % external shampoo     Sulfacetamide Sodium-Sulfur 10-5 % LIQD     LORazepam (ATIVAN) 0.5 MG tablet     valACYclovir (VALTREX) 1000 mg tablet     No current facility-administered medications for this visit.   OTC - magnesium, vitamin D, multivitamin and zyrtec     Allergies   Allergen Reactions     Penicillins Hives and Other (See Comments)     PN: LW Reaction: Unknown Reaction       Other Drug Allergy (See Comments) Rash     Niles Wipes--folliculitis and ongoing rash for over 6 months     Latex Other (See Comments)     swelling   PN: Converted from LW Latex Sensitivity Flag  swelling        Miconazole Rash        Social History     Tobacco Use      "Smoking status: Never     Smokeless tobacco: Never   Vaping Use     Vaping status: Never Used   Substance Use Topics     Alcohol use: Yes     Alcohol/week: 3.0 standard drinks of alcohol     Types: 3 Glasses of wine per week     Family History   Problem Relation Age of Onset     Anxiety Disorder Mother      Skin Cancer Mother      Hypertension Mother      Anemia Mother      Depression Father      Hyperlipidemia Father      Impaired Fasting Glucose Father      Colon Polyps Father         abnormal     Attention Deficit Disorder Brother      Skin Cancer Maternal Grandmother      Vertigo Maternal Grandmother      Other Cancer Maternal Grandmother         Skin cancer     Alcoholism Maternal Grandfather      Vertigo Maternal Grandfather      Asthma Paternal Grandmother      Diabetes Type 2  Paternal Grandfather      Heart Surgery Paternal Grandfather      Cerebrovascular Disease Paternal Grandfather      Diabetes Paternal Grandfather      Obesity Other      History   Drug Use Unknown     Comment: CBD gummies         Objective     /79 (BP Location: Right arm, Patient Position: Sitting, Cuff Size: Adult Small)   Pulse 60   Temp 97.7  F (36.5  C) (Tympanic)   Resp 16   Ht 1.626 m (5' 4\")   SpO2 100%   BMI 22.02 kg/m      Physical Exam    GENERAL APPEARANCE: healthy, alert and no distress     EYES: EOMI     HENT: right ear canal with cerumen, left ear canal and TM normal and nose and mouth without ulcers or lesions     NECK: no adenopathy, no asymmetry, masses, or scars and thyroid normal to palpation     RESP: lungs clear to auscultation - no rales, rhonchi or wheezes     CV: regular rates and rhythm, normal S1 S2     ABDOMEN:  soft, nontender, no HSM or masses and bowel sounds normal     MS: extremities normal- no gross deformities noted, no evidence of inflammation in joints, FROM in all extremities.     SKIN: no suspicious lesions or rashes     NEURO: Normal strength and tone, sensory exam grossly normal, " mentation intact and speech normal     PSYCH: mentation appears normal. and affect normal/bright     LYMPHATICS: No cervical adenopathy    Recent Labs   Lab Test 07/29/22  0905   HGB 13.5        Diagnostics:  No labs were ordered during this visit.   No EKG required, no history of coronary heart disease, significant arrhythmia, peripheral arterial disease or other structural heart disease.    Revised Cardiac Risk Index (RCRI):  The patient has the following serious cardiovascular risks for perioperative complications:   - No serious cardiac risks = 0 points     RCRI Interpretation: 0 points: Class I (very low risk - 0.4% complication rate)           Signed Electronically by: Kizzy Marquez MD  Copy of this evaluation report is provided to requesting physician.

## 2023-08-25 ENCOUNTER — MYC MEDICAL ADVICE (OUTPATIENT)
Dept: FAMILY MEDICINE | Facility: CLINIC | Age: 33
End: 2023-08-25

## 2023-08-25 ENCOUNTER — OFFICE VISIT (OUTPATIENT)
Dept: FAMILY MEDICINE | Facility: CLINIC | Age: 33
End: 2023-08-25
Payer: COMMERCIAL

## 2023-08-25 VITALS
RESPIRATION RATE: 16 BRPM | HEIGHT: 64 IN | TEMPERATURE: 97.7 F | DIASTOLIC BLOOD PRESSURE: 79 MMHG | BODY MASS INDEX: 22.02 KG/M2 | OXYGEN SATURATION: 100 % | SYSTOLIC BLOOD PRESSURE: 112 MMHG | HEART RATE: 78 BPM

## 2023-08-25 DIAGNOSIS — Z11.3 SCREEN FOR STD (SEXUALLY TRANSMITTED DISEASE): ICD-10-CM

## 2023-08-25 DIAGNOSIS — K59.00 CONSTIPATION, UNSPECIFIED CONSTIPATION TYPE: ICD-10-CM

## 2023-08-25 DIAGNOSIS — R73.01 ELEVATED FASTING BLOOD SUGAR: ICD-10-CM

## 2023-08-25 DIAGNOSIS — Z00.00 ROUTINE GENERAL MEDICAL EXAMINATION AT A HEALTH CARE FACILITY: Primary | ICD-10-CM

## 2023-08-25 LAB
CLUE CELLS: ABNORMAL
HBA1C MFR BLD: 5.2 % (ref 0–5.6)
HBV SURFACE AG SERPL QL IA: NONREACTIVE
HCV AB SERPL QL IA: NONREACTIVE
HIV 1+2 AB+HIV1 P24 AG SERPL QL IA: NONREACTIVE
T PALLIDUM AB SER QL: NONREACTIVE
TRICHOMONAS, WET PREP: ABNORMAL
WBC'S/HIGH POWER FIELD, WET PREP: ABNORMAL
YEAST, WET PREP: ABNORMAL

## 2023-08-25 PROCEDURE — 87389 HIV-1 AG W/HIV-1&-2 AB AG IA: CPT | Performed by: FAMILY MEDICINE

## 2023-08-25 PROCEDURE — 87591 N.GONORRHOEAE DNA AMP PROB: CPT | Performed by: FAMILY MEDICINE

## 2023-08-25 PROCEDURE — 86780 TREPONEMA PALLIDUM: CPT | Performed by: FAMILY MEDICINE

## 2023-08-25 PROCEDURE — 99395 PREV VISIT EST AGE 18-39: CPT | Performed by: FAMILY MEDICINE

## 2023-08-25 PROCEDURE — 86803 HEPATITIS C AB TEST: CPT | Performed by: FAMILY MEDICINE

## 2023-08-25 PROCEDURE — 87340 HEPATITIS B SURFACE AG IA: CPT | Performed by: FAMILY MEDICINE

## 2023-08-25 PROCEDURE — 36415 COLL VENOUS BLD VENIPUNCTURE: CPT | Performed by: FAMILY MEDICINE

## 2023-08-25 PROCEDURE — 87210 SMEAR WET MOUNT SALINE/INK: CPT | Performed by: FAMILY MEDICINE

## 2023-08-25 PROCEDURE — 87491 CHLMYD TRACH DNA AMP PROBE: CPT | Performed by: FAMILY MEDICINE

## 2023-08-25 PROCEDURE — 99214 OFFICE O/P EST MOD 30 MIN: CPT | Mod: 25 | Performed by: FAMILY MEDICINE

## 2023-08-25 PROCEDURE — 83036 HEMOGLOBIN GLYCOSYLATED A1C: CPT | Performed by: FAMILY MEDICINE

## 2023-08-25 ASSESSMENT — ENCOUNTER SYMPTOMS
CHILLS: 0
SHORTNESS OF BREATH: 0
HEARTBURN: 0
JOINT SWELLING: 0
NAUSEA: 0
CONSTIPATION: 1
EYE PAIN: 0
ABDOMINAL PAIN: 0
WEAKNESS: 0
DIARRHEA: 0
MYALGIAS: 0
HEMATOCHEZIA: 0
DYSURIA: 0
BREAST MASS: 0
FEVER: 0
DIZZINESS: 0
HEMATURIA: 0
HEADACHES: 1
PARESTHESIAS: 0
FREQUENCY: 0
SORE THROAT: 0
COUGH: 0
ARTHRALGIAS: 0
NERVOUS/ANXIOUS: 1
PALPITATIONS: 0

## 2023-08-25 ASSESSMENT — PATIENT HEALTH QUESTIONNAIRE - PHQ9
SUM OF ALL RESPONSES TO PHQ QUESTIONS 1-9: 5
10. IF YOU CHECKED OFF ANY PROBLEMS, HOW DIFFICULT HAVE THESE PROBLEMS MADE IT FOR YOU TO DO YOUR WORK, TAKE CARE OF THINGS AT HOME, OR GET ALONG WITH OTHER PEOPLE: SOMEWHAT DIFFICULT
SUM OF ALL RESPONSES TO PHQ QUESTIONS 1-9: 5

## 2023-08-25 ASSESSMENT — PAIN SCALES - GENERAL: PAINLEVEL: NO PAIN (0)

## 2023-08-25 NOTE — PROGRESS NOTES
SUBJECTIVE:   CC: Janeth is an 33 year old who presents for preventive health visit.       8/25/2023     7:26 AM   Additional Questions   Roomed by Regan Anderson   Accompanied by Self       Healthy Habits:     Getting at least 3 servings of Calcium per day:  Yes    Bi-annual eye exam:  NO    Dental care twice a year:  Yes    Sleep apnea or symptoms of sleep apnea:  Daytime drowsiness    Diet:  Other    Frequency of exercise:  1 day/week    Duration of exercise:  30-45 minutes    Taking medications regularly:  Yes    Additional concerns today:  Yes      Constipation got worse with anesthesia but it queen also been years of constipation. She has not had a BM since Sunday. She will only have BM if she takes multiple laxatives - magnesium supplements, high fiber and leafy greens in her diet, OTC stool softner, sometimes senna laxatives (4 days will work), OTC milk of magnesium usually helps. Last week it was a week and she took senna for a few days for it to work. Metamucil previously gave her bad gas, last tried it 2008. She drinks kamubuchi pretty regularly, greek yogurt once to three times/week.       Today's PHQ-9 Score:       8/25/2023     7:13 AM   PHQ-9 SCORE   PHQ-9 Total Score MyChart 5 (Mild depression)   PHQ-9 Total Score 5           Social History     Tobacco Use    Smoking status: Never    Smokeless tobacco: Never   Substance Use Topics    Alcohol use: Yes     Alcohol/week: 3.0 standard drinks of alcohol     Types: 3 Glasses of wine per week             8/25/2023     7:16 AM   Alcohol Use   Prescreen: >3 drinks/day or >7 drinks/week? Yes   AUDIT SCORE  5         8/25/2023     7:16 AM   AUDIT - Alcohol Use Disorders Identification Test - Reproduced from the World Health Organization Audit 2001 (Second Edition)   1.  How often do you have a drink containing alcohol? 4 or more times a week   2.  How many drinks containing alcohol do you have on a typical day when you are drinking? 1 or 2   3.  How often do you  have five or more drinks on one occasion? Never   4.  How often during the last year have you found that you were not able to stop drinking once you had started? Never   5.  How often during the last year have you failed to do what was normally expected of you because of drinking? Never   6.  How often during the last year have you needed a first drink in the morning to get yourself going after a heavy drinking session? Never   7.  How often during the last year have you had a feeling of guilt or remorse after drinking? Less than monthly   8.  How often during the last year have you been unable to remember what happened the night before because of your drinking? Never   9.  Have you or someone else been injured because of your drinking? No   10. Has a relative, friend, doctor or other health care worker been concerned about your drinking or suggested you cut down? No   TOTAL SCORE 5     Reviewed orders with patient.  Reviewed health maintenance and updated orders accordingly - Yes      Breast Cancer Screenin/29/2022     8:18 AM 2022     9:12 AM   Breast CA Risk Assessment (FHS-7)   Do you have a family history of breast, colon, or ovarian cancer? No / Unknown No / Unknown           Pertinent mammograms are reviewed under the imaging tab.    History of abnormal Pap smear: NO - age 30- 65 PAP every 3 years recommended     Reviewed and updated as needed this visit by clinical staff   Tobacco  Allergies  Meds              Reviewed and updated as needed this visit by Provider                     Review of Systems   Constitutional:  Negative for chills and fever.   HENT:  Negative for congestion, ear pain, hearing loss and sore throat.    Eyes:  Negative for pain and visual disturbance.   Respiratory:  Negative for cough and shortness of breath.    Cardiovascular:  Negative for chest pain, palpitations and peripheral edema.   Gastrointestinal:  Positive for constipation. Negative for abdominal pain,  "diarrhea, heartburn, hematochezia and nausea.   Breasts:  Negative for tenderness, breast mass and discharge.   Genitourinary:  Negative for dysuria, frequency, genital sores, hematuria, pelvic pain, urgency, vaginal bleeding and vaginal discharge.   Musculoskeletal:  Negative for arthralgias, joint swelling and myalgias.   Skin:  Negative for rash.   Neurological:  Positive for headaches. Negative for dizziness, weakness and paresthesias.   Psychiatric/Behavioral:  Negative for mood changes. The patient is nervous/anxious.           OBJECTIVE:   /79 (BP Location: Right arm, Patient Position: Sitting, Cuff Size: Adult Regular)   Pulse 78   Temp 97.7  F (36.5  C) (Tympanic)   Resp 16   Ht 1.626 m (5' 4\")   LMP  (LMP Unknown)   SpO2 100%   Breastfeeding No   BMI 22.02 kg/m    Physical Exam  GENERAL: healthy, alert and no distress  EYES: Eyes grossly normal to inspection  HENT: ear canals and TM's normal, nose and mouth without ulcers or lesions  NECK: no adenopathy, no asymmetry, masses, or scars and thyroid normal to palpation  RESP: lungs clear to auscultation - no rales, rhonchi or wheezes  BREAST: normal without masses, tenderness or nipple discharge and no palpable axillary masses or adenopathy  CV: regular rate and rhythm, normal S1 S2  ABDOMEN: soft, nontender, no hepatosplenomegaly, no masses and bowel sounds normal  MS: no gross musculoskeletal defects noted, no edema  SKIN: no suspicious lesions or rashes  NEURO: Normal strength and tone, mentation intact and speech normal  PSYCH: mentation appears normal, affect catalina    Diagnostic Test Results:  Labs reviewed in Epic    ASSESSMENT/PLAN:     1. Routine general medical examination at a health care facility  - up to date with pap smear and immunizations     2. Screen for STD (sexually transmitted disease)  - declined C/G oral/rectal  - Treponema Abs w Reflex to RPR and Titer; Future  - NEISSERIA GONORRHOEA PCR; Future  - CHLAMYDIA TRACHOMATIS " PCR; Future  - Hepatitis B surface antigen; Future  - HIV Antigen Antibody Combo; Future  - Wet prep - lab collect; Future  - Hepatitis C Screen Reflex to HCV RNA Quant and Genotype; Future    3. Elevated fasting blood sugar  - Hemoglobin A1c; Future    4. Constipation, unspecified constipation type  - advised below  Using milk of magnesium as needed once/week, goal is to use it once/month or less  Continue Kampuchea and greek yogurt  Continue increase fiber intake  Please start daily metamucil  If symptoms are not improving in 6-12 weeks, then I would recommend seeing gastroenterologist for further evaluation    - Adult GI  Referral - Consult Only; Future     Patient has been advised of split billing requirements and indicates understanding: Yes      COUNSELING:  Reviewed preventive health counseling, as reflected in patient instructions        She reports that she has never smoked. She has never used smokeless tobacco.          Kizzy Marquez MD  [facility] submitted by the patient for this visit:  Patient Health Questionnaire (Submitted on 8/25/2023)  If you checked off any problems, how difficult have these problems made it for you to do your work, take care of things at home, or get along with other people?: Somewhat difficult  PHQ9 TOTAL SCORE: 5  Annual Preventive Visit (Submitted on 8/25/2023)  Chief Complaint: Annual Exam:  Frequency of exercise:: 1 day/week  Getting at least 3 servings of Calcium per day:: Yes  Diet:: Other  Taking medications regularly:: Yes  Bi-annual eye exam:: NO  Dental care twice a year:: Yes  Sleep apnea or symptoms of sleep apnea:: Daytime drowsiness  abdominal pain: No  Blood in stool: No  Blood in urine: No  chest pain: No  chills: No  congestion: No  constipation: Yes  cough: No  diarrhea: No  dizziness: No  ear pain: No  eye pain: No  nervous/anxious: Yes  fever: No  frequency: No  genital sores: No  headaches: Yes  hearing  loss: No  heartburn: No  arthralgias: No  joint swelling: No  peripheral edema: No  mood changes: No  myalgias: No  nausea: No  dysuria: No  palpitations: No  Skin sensation changes: No  sore throat: No  urgency: No  rash: No  shortness of breath: No  visual disturbance: No  weakness: No  pelvic pain: No  vaginal bleeding: No  vaginal discharge: No  tenderness: No  breast mass: No  breast discharge: No  Additional concerns today:: Yes  Exercise outside of work (Submitted on 8/25/2023)  Chief Complaint: Annual Exam:  Duration of exercise:: 30-45 minutes

## 2023-08-25 NOTE — PATIENT INSTRUCTIONS
Constipation -   Using milk of magnesium as needed once/week, goal is to use it once/month or less  Continue Kampuchea and greek yogurt  Continue increase fiber intake  Please start daily metamucil  If symptoms are not improving in 6-12 weeks, then I would recommend seeing gastroenterologist for further evaluation        Preventive Health Recommendations  Female Ages 26 - 39  Yearly exam:   See your health care provider every year in order to  Review health changes.   Discuss preventive care.    Review your medicines if you your doctor has prescribed any.    Until age 30: Get a Pap test every three years (more often if you have had an abnormal result).    After age 30: Talk to your doctor about whether you should have a Pap test every 3 years or have a Pap test with HPV screening every 5 years.   You do not need a Pap test if your uterus was removed (hysterectomy) and you have not had cancer.  You should be tested each year for STDs (sexually transmitted diseases), if you're at risk.   Talk to your provider about how often to have your cholesterol checked.  If you are at risk for diabetes, you should have a diabetes test (fasting glucose).  Shots: Get a flu shot each year. Get a tetanus shot every 10 years.   Nutrition:   Eat at least 5 servings of fruits and vegetables each day.  Eat whole-grain bread, whole-wheat pasta and brown rice instead of white grains and rice.  Get adequate Calcium and Vitamin D.     Lifestyle  Exercise at least 150 minutes a week (30 minutes a day, 5 days of the week). This will help you control your weight and prevent disease.  Limit alcohol to one drink per day.  No smoking.   Wear sunscreen to prevent skin cancer.  See your dentist every six months for an exam and cleaning.

## 2023-08-26 LAB
C TRACH DNA SPEC QL NAA+PROBE: NEGATIVE
N GONORRHOEA DNA SPEC QL NAA+PROBE: NEGATIVE

## 2023-10-12 ENCOUNTER — MYC MEDICAL ADVICE (OUTPATIENT)
Dept: FAMILY MEDICINE | Facility: CLINIC | Age: 33
End: 2023-10-12
Payer: COMMERCIAL

## 2023-10-12 NOTE — TELEPHONE ENCOUNTER
-- Patient concerned about WBC's on wet prep. It appears that all STI tests came back negative. Please advise.     Buffy Rodríguez RN  [facility]

## 2023-10-19 NOTE — TELEPHONE ENCOUNTER
It showed some bacteria but as it was isolated it's non-diagnostic as negative chlamydia/gonorrhea, bacterial vaginosis and yeast.

## 2023-10-19 NOTE — TELEPHONE ENCOUNTER
Message sent via [employer].    Ailcia Veronica, RN, BSN, PHN  [facility]  [phone]

## 2024-10-13 ENCOUNTER — HEALTH MAINTENANCE LETTER (OUTPATIENT)
Age: 34
End: 2024-10-13

## 2024-11-04 SDOH — HEALTH STABILITY: PHYSICAL HEALTH: ON AVERAGE, HOW MANY MINUTES DO YOU ENGAGE IN EXERCISE AT THIS LEVEL?: 20 MIN

## 2024-11-04 SDOH — HEALTH STABILITY: PHYSICAL HEALTH: ON AVERAGE, HOW MANY DAYS PER WEEK DO YOU ENGAGE IN MODERATE TO STRENUOUS EXERCISE (LIKE A BRISK WALK)?: 7 DAYS

## 2024-11-04 ASSESSMENT — SOCIAL DETERMINANTS OF HEALTH (SDOH): HOW OFTEN DO YOU GET TOGETHER WITH FRIENDS OR RELATIVES?: ONCE A WEEK

## 2024-11-08 ENCOUNTER — OFFICE VISIT (OUTPATIENT)
Dept: FAMILY MEDICINE | Facility: CLINIC | Age: 34
End: 2024-11-08
Payer: COMMERCIAL

## 2024-11-08 VITALS
HEART RATE: 89 BPM | SYSTOLIC BLOOD PRESSURE: 128 MMHG | HEIGHT: 65 IN | DIASTOLIC BLOOD PRESSURE: 84 MMHG | RESPIRATION RATE: 18 BRPM | TEMPERATURE: 97.9 F | BODY MASS INDEX: 21.3 KG/M2 | OXYGEN SATURATION: 98 %

## 2024-11-08 DIAGNOSIS — B96.89 BACTERIAL VAGINOSIS: ICD-10-CM

## 2024-11-08 DIAGNOSIS — Z00.00 ROUTINE GENERAL MEDICAL EXAMINATION AT A HEALTH CARE FACILITY: Primary | ICD-10-CM

## 2024-11-08 DIAGNOSIS — Z11.3 SCREEN FOR STD (SEXUALLY TRANSMITTED DISEASE): ICD-10-CM

## 2024-11-08 DIAGNOSIS — N76.0 BACTERIAL VAGINOSIS: ICD-10-CM

## 2024-11-08 LAB
CHOLEST SERPL-MCNC: 171 MG/DL
CLUE CELLS: PRESENT
EST. AVERAGE GLUCOSE BLD GHB EST-MCNC: 100 MG/DL
FASTING STATUS PATIENT QL REPORTED: YES
HBA1C MFR BLD: 5.1 % (ref 0–5.6)
HBV SURFACE AB SERPL IA-ACNC: 712 M[IU]/ML
HBV SURFACE AB SERPL IA-ACNC: REACTIVE M[IU]/ML
HCV AB SERPL QL IA: NONREACTIVE
HDLC SERPL-MCNC: 67 MG/DL
HIV 1+2 AB+HIV1 P24 AG SERPL QL IA: NONREACTIVE
LDLC SERPL CALC-MCNC: 93 MG/DL
NONHDLC SERPL-MCNC: 104 MG/DL
T PALLIDUM AB SER QL: NONREACTIVE
TRICHOMONAS, WET PREP: ABNORMAL
TRIGL SERPL-MCNC: 55 MG/DL
WBC'S/HIGH POWER FIELD, WET PREP: ABNORMAL
YEAST, WET PREP: ABNORMAL

## 2024-11-08 PROCEDURE — 90471 IMMUNIZATION ADMIN: CPT | Performed by: FAMILY MEDICINE

## 2024-11-08 PROCEDURE — 90480 ADMN SARSCOV2 VAC 1/ONLY CMP: CPT | Performed by: FAMILY MEDICINE

## 2024-11-08 PROCEDURE — 87491 CHLMYD TRACH DNA AMP PROBE: CPT | Performed by: FAMILY MEDICINE

## 2024-11-08 PROCEDURE — 83036 HEMOGLOBIN GLYCOSYLATED A1C: CPT | Performed by: FAMILY MEDICINE

## 2024-11-08 PROCEDURE — 36415 COLL VENOUS BLD VENIPUNCTURE: CPT | Performed by: FAMILY MEDICINE

## 2024-11-08 PROCEDURE — 90656 IIV3 VACC NO PRSV 0.5 ML IM: CPT | Performed by: FAMILY MEDICINE

## 2024-11-08 PROCEDURE — 99213 OFFICE O/P EST LOW 20 MIN: CPT | Mod: 25 | Performed by: FAMILY MEDICINE

## 2024-11-08 PROCEDURE — 86803 HEPATITIS C AB TEST: CPT | Performed by: FAMILY MEDICINE

## 2024-11-08 PROCEDURE — 86706 HEP B SURFACE ANTIBODY: CPT | Performed by: FAMILY MEDICINE

## 2024-11-08 PROCEDURE — 87389 HIV-1 AG W/HIV-1&-2 AB AG IA: CPT | Performed by: FAMILY MEDICINE

## 2024-11-08 PROCEDURE — 99395 PREV VISIT EST AGE 18-39: CPT | Mod: 25 | Performed by: FAMILY MEDICINE

## 2024-11-08 PROCEDURE — 87591 N.GONORRHOEAE DNA AMP PROB: CPT | Performed by: FAMILY MEDICINE

## 2024-11-08 PROCEDURE — 87210 SMEAR WET MOUNT SALINE/INK: CPT | Performed by: FAMILY MEDICINE

## 2024-11-08 PROCEDURE — 91320 SARSCV2 VAC 30MCG TRS-SUC IM: CPT | Performed by: FAMILY MEDICINE

## 2024-11-08 PROCEDURE — 80061 LIPID PANEL: CPT | Performed by: FAMILY MEDICINE

## 2024-11-08 PROCEDURE — 86780 TREPONEMA PALLIDUM: CPT | Performed by: FAMILY MEDICINE

## 2024-11-08 RX ORDER — METRONIDAZOLE 500 MG/1
500 TABLET ORAL 2 TIMES DAILY
Qty: 14 TABLET | Refills: 0 | Status: SHIPPED | OUTPATIENT
Start: 2024-11-08 | End: 2024-11-15

## 2024-11-08 ASSESSMENT — PATIENT HEALTH QUESTIONNAIRE - PHQ9
10. IF YOU CHECKED OFF ANY PROBLEMS, HOW DIFFICULT HAVE THESE PROBLEMS MADE IT FOR YOU TO DO YOUR WORK, TAKE CARE OF THINGS AT HOME, OR GET ALONG WITH OTHER PEOPLE: SOMEWHAT DIFFICULT
SUM OF ALL RESPONSES TO PHQ QUESTIONS 1-9: 3
SUM OF ALL RESPONSES TO PHQ QUESTIONS 1-9: 3

## 2024-11-08 NOTE — PATIENT INSTRUCTIONS
Patient Education   Preventive Care Advice   This is general advice given by our system to help you stay healthy. However, your care team may have specific advice just for you. Please talk to your care team about your preventive care needs.  Nutrition  Eat 5 or more servings of fruits and vegetables each day.  Try wheat bread, brown rice and whole grain pasta (instead of white bread, rice, and pasta).  Get enough calcium and vitamin D. Check the label on foods and aim for 100% of the RDA (recommended daily allowance).  Lifestyle  Exercise at least 150 minutes each week  (30 minutes a day, 5 days a week).  Do muscle strengthening activities 2 days a week. These help control your weight and prevent disease.  No smoking.  Wear sunscreen to prevent skin cancer.  Have a dental exam and cleaning every 6 months.  Yearly exams  See your health care team every year to talk about:  Any changes in your health.  Any medicines your care team has prescribed.  Preventive care, family planning, and ways to prevent chronic diseases.  Shots (vaccines)   HPV shots (up to age 26), if you've never had them before.  Hepatitis B shots (up to age 59), if you've never had them before.  COVID-19 shot: Get this shot when it's due.  Flu shot: Get a flu shot every year.  Tetanus shot: Get a tetanus shot every 10 years.  Pneumococcal, hepatitis A, and RSV shots: Ask your care team if you need these based on your risk.  Shingles shot (for age 50 and up)  General health tests  Diabetes screening:  Starting at age 35, Get screened for diabetes at least every 3 years.  If you are younger than age 35, ask your care team if you should be screened for diabetes.  Cholesterol test: At age 39, start having a cholesterol test every 5 years, or more often if advised.  Bone density scan (DEXA): At age 50, ask your care team if you should have this scan for osteoporosis (brittle bones).  Hepatitis C: Get tested at least once in your life.  STIs (sexually  transmitted infections)  Before age 24: Ask your care team if you should be screened for STIs.  After age 24: Get screened for STIs if you're at risk. You are at risk for STIs (including HIV) if:  You are sexually active with more than one person.  You don't use condoms every time.  You or a partner was diagnosed with a sexually transmitted infection.  If you are at risk for HIV, ask about PrEP medicine to prevent HIV.  Get tested for HIV at least once in your life, whether you are at risk for HIV or not.  Cancer screening tests  Cervical cancer screening: If you have a cervix, begin getting regular cervical cancer screening tests starting at age 21.  Breast cancer scan (mammogram): If you've ever had breasts, begin having regular mammograms starting at age 40. This is a scan to check for breast cancer.  Colon cancer screening: It is important to start screening for colon cancer at age 45.  Have a colonoscopy test every 10 years (or more often if you're at risk) Or, ask your provider about stool tests like a FIT test every year or Cologuard test every 3 years.  To learn more about your testing options, visit:   .  For help making a decision, visit:   https://bit.ly/th38238.  Prostate cancer screening test: If you have a prostate, ask your care team if a prostate cancer screening test (PSA) at age 55 is right for you.  Lung cancer screening: If you are a current or former smoker ages 50 to 80, ask your care team if ongoing lung cancer screenings are right for you.  For informational purposes only. Not to replace the advice of your health care provider. Copyright   2023 [facility]. All rights reserved. Clinically reviewed by the [facility] Transitions Program. [employer] 139691 - REV 01/24.  Learning About Stress  What is stress?     Stress is your body's response to a hard situation. Your body can have a physical, emotional, or mental response. Stress is a fact of life for most people, and it  affects everyone differently. What causes stress for you may not be stressful for someone else.  A lot of things can cause stress. You may feel stress when you go on a job interview, take a test, or run a race. This kind of short-term stress is normal and even useful. It can help you if you need to work hard or react quickly. For example, stress can help you finish an important job on time.  Long-term stress is caused by ongoing stressful situations or events. Examples of long-term stress include long-term health problems, ongoing problems at work, or conflicts in your family. Long-term stress can harm your health.  How does stress affect your health?  When you are stressed, your body responds as though you are in danger. It makes hormones that speed up your heart, make you breathe faster, and give you a burst of energy. This is called the fight-or-flight stress response. If the stress is over quickly, your body goes back to normal and no harm is done.  But if stress happens too often or lasts too long, it can have bad effects. Long-term stress can make you more likely to get sick, and it can make symptoms of some diseases worse. If you tense up when you are stressed, you may develop neck, shoulder, or low back pain. Stress is linked to high blood pressure and heart disease.  Stress also harms your emotional health. It can make you jovel, tense, or depressed. Your relationships may suffer, and you may not do well at work or school.  What can you do to manage stress?  You can try these things to help manage stress:   Do something active. Exercise or activity can help reduce stress. Walking is a great way to get started. Even everyday activities such as housecleaning or yard work can help.  Try yoga or armando chi. These techniques combine exercise and meditation. You may need some training at first to learn them.  Do something you enjoy. For example, listen to music or go to a movie. Practice your hobby or do volunteer  "work.  Meditate. This can help you relax, because you are not worrying about what happened before or what may happen in the future.  Do guided imagery. Imagine yourself in any setting that helps you feel calm. You can use online videos, books, or a teacher to guide you.  Do breathing exercises. For example:  From a standing position, bend forward from the waist with your knees slightly bent. Let your arms dangle close to the floor.  Breathe in slowly and deeply as you return to a standing position. Roll up slowly and lift your head last.  Hold your breath for just a few seconds in the standing position.  Breathe out slowly and bend forward from the waist.  Let your feelings out. Talk, laugh, cry, and express anger when you need to. Talking with supportive friends or family, a counselor, or a mikhail leader about your feelings is a healthy way to relieve stress. Avoid discussing your feelings with people who make you feel worse.  Write. It may help to write about things that are bothering you. This helps you find out how much stress you feel and what is causing it. When you know this, you can find better ways to cope.  What can you do to prevent stress?  You might try some of these things to help prevent stress:  Manage your time. This helps you find time to do the things you want and need to do.  Get enough sleep. Your body recovers from the stresses of the day while you are sleeping.  Get support. Your family, friends, and community can make a difference in how you experience stress.  Limit your news feed. Avoid or limit time on social media or news that may make you feel stressed.  Do something active. Exercise or activity can help reduce stress. Walking is a great way to get started.  Where can you learn more?  Go to https://www.[employer].net/patiented  Enter N032 in the search box to learn more about \"Learning About Stress.\"  Current as of: October 24, 2023  Content Version: 14.2 2024 [employer]. " "  Care instructions adapted under license by your healthcare professional. If you have questions about a medical condition or this instruction, always ask your healthcare professional. [employer] disclaims any warranty or liability for your use of this information.    9 Ways to Cut Back on Drinking  Maybe you've found yourself drinking more alcohol than you'd prefer. If you want to cut back, here are some ideas to try.    Think before you drink.  Do you really want a drink, or is it just a habit? If you're used to having a drink at a certain time, try doing something else then.     Look for substitutes.  Find some no-alcohol drinks that you enjoy, like flavored seltzer water, tea with honey, or tonic with a slice of lime. Or try alcohol-free beer or \"virgin\" cocktails (without the alcohol).     Drink more water.  Use water to quench your thirst. Drink a glass of water before you have any alcohol. Have another glass along with every drink or between drinks.     Shrink your drink.  For example, have a bottle of beer instead of a pint. Use a smaller glass for wine. Choose drinks with lower alcohol content (ABV%). Or use less liquor and more mixer in cocktails.     Slow down.  It's easy to drink quickly and without thinking about it. Pay attention, and make each drink last longer.     Do the math.  Total up how much you spend on alcohol each month. How much is that a year? If you cut back, what could you do with the money you save?     Take a break.  Choose a day or two each week when you won't drink at all. Notice how you feel on those days, physically and emotionally. How did you sleep? Do you feel better? Over time, add more break days.     Count calories.  Would you like to lose some weight? For some people that's a good motivator for cutting back. Figure out how many calories are in each drink. How many does that add up to in a day? In a week? In a month?     Practice saying no.  Be ready when someone " "offers you a drink. Try: \"Thanks, I've had enough.\" Or \"Thanks, but I'm cutting back.\" Or \"No, thanks. I feel better when I drink less.\"   Current as of: November 15, 2023  Content Version: 14.2 2024 [facility] Values of n [facility].   Care instructions adapted under license by your healthcare professional. If you have questions about a medical condition or this instruction, always ask your healthcare professional. [employer] Inc[employer] disclaims any warranty or liability for your use of this information.     "

## 2024-11-08 NOTE — PROGRESS NOTES
Preventive Care Visit  [facility]  Kizzy Marquez MD, Family Medicine  Nov 8, 2024      Assessment & Plan     Routine general medical examination at a health care facility  - Lipid panel reflex to direct LDL Fasting; Future  - Hemoglobin A1c; Future    Screen for STD (sexually transmitted disease)  - Treponema Abs w Reflex to RPR and Titer; Future  - Hepatitis B Surface Antibody; Future  - HIV Antigen Antibody Combo; Future  - Hepatitis C Screen Reflex to HCV RNA Quant and Genotype; Future  - Chlamydia trachomatis/Neisseria gonorrhoeae by PCR - Clinic Collect  - Wet prep - lab collect; Future    Bacterial vaginosis   - sent tx for Flagyl 500 mg BID             Counseling  Appropriate preventive services were addressed with this patient via screening, questionnaire, or discussion as appropriate for fall prevention, nutrition, physical activity, Tobacco-use cessation, social engagement, weight loss and cognition.  Checklist reviewing preventive services available has been given to the patient.  Reviewed patient's diet, addressing concerns and/or questions.   The patient reports drinking more than 3 alcoholic drinks per day and/or more than 7 drhnks per week. The patient was counseled and given information about possible harmful effects of excessive alcohol intake.        Ganesh Fowler is a 34 year old, presenting for the following:  Physical        11/8/2024     8:49 AM   Additional Questions   Roomed by edda   Accompanied by self         11/8/2024   Declines Weight   Did patient decline having their weight taken? Yes             HPI    Health Care Directive  Patient does not have a Health Care Directive: Discussed advance care planning with patient; information given to patient to review.      11/4/2024   General Health   How would you rate your overall physical health? Good   Feel stress (tense, anxious, or unable to sleep) Rather much      (!) STRESS CONCERN      11/4/2024    Nutrition   Three or more servings of calcium each day? Yes   Diet: Regular (no restrictions)   How many servings of fruit and vegetables per day? (!) 2-3   How many sweetened beverages each day? 0-1            11/4/2024   Exercise   Days per week of moderate/strenous exercise 7 days   Average minutes spent exercising at this level 20 min            11/4/2024   Social Factors   Frequency of gathering with friends or relatives Once a week   Worry food won't last until get money to buy more No   Food not last or not have enough money for food? No   Do you have housing? (Housing is defined as stable permanent housing and does not include staying ouside in a car, in a tent, in an abandoned building, in an overnight shelter, or couch-surfing.) Yes   Are you worried about losing your housing? Patient declined   Lack of transportation? No   Unable to get utilities (heat,electricity)? No            11/4/2024   Dental   Dentist two times every year? Yes            11/4/2024   TB Screening   Were you born outside of the [place]? No          Today's PHQ-9 Score:       11/8/2024     8:44 AM   PHQ-9 SCORE   PHQ-9 Total Score MyChart 3 (Minimal depression)   PHQ-9 Total Score 3        Patient-reported         11/4/2024   Substance Use   Alcohol more than 3/day or more than 7/wk Yes   How often do you have a drink containing alcohol 2 to 3 times a week   How many alcohol drinks on typical day 1 or 2   How often do you have 5+ drinks at one occasion Never   Audit 2/3 Score 0   How often not able to stop drinking once started Never   How often failed to do what normally expected Never   How often needed first drink in am after a heavy drinking session Never   How often feeling of guilt or remorse after drinking Less than monthly   How often unable to remember what happened the night before Never   Have you or someone else been injured because of your drinking No   Has anyone been concerned or suggested you cut down on drinking No  "  TOTAL SCORE - AUDIT 4   Do you use any other substances recreationally? No        Social History     Tobacco Use    Smoking status: Never    Smokeless tobacco: Never   Vaping Use    Vaping status: Never Used   Substance Use Topics    Alcohol use: Yes     Alcohol/week: 3.0 standard drinks of alcohol     Types: 3 Glasses of wine per week    Drug use: Not Currently     Comment: CBD gummies           10/20/2021   LAST FHS-7 RESULTS   1st degree relative breast or ovarian cancer No   Any relative bilateral breast cancer No   Any male have breast cancer No   Any ONE woman have BOTH breast AND ovarian cancer No   Any woman with breast cancer before 50yrs No   2 or more relatives with breast AND/OR ovarian cancer No   2 or more relatives with breast AND/OR bowel cancer No                   11/4/2024   STI Screening   New sexual partner(s) since last STI/HIV test? No        History of abnormal Pap smear: No - age 30- 64 PAP with HPV every 5 years recommended        1/15/2020     3:55 PM   PAP / HPV   PAP-ABSTRACT See Scanned Document           This result is from an external source.           11/4/2024   Contraception/Family Planning   Questions about contraception or family planning No           Reviewed and updated as needed this visit by Provider                             Objective    Exam  There were no vitals taken for this visit.   Estimated body mass index is 22.02 kg/m  as calculated from the following:    Height as of 8/25/23: 1.626 m (5' 4\").    Weight as of 2/20/23: 58.2 kg (128 lb 4.8 oz).    Physical Exam          Signed Electronically by: Kizzy Marquez MD    Answers submitted by the patient for this visit:  Patient Health Questionnaire (Submitted on 11/8/2024)  If you checked off any problems, how difficult have these problems made it for you to do your work, take care of things at home, or get along with other people?: Somewhat difficult  PHQ9 TOTAL SCORE: 3    "

## 2024-11-09 LAB
C TRACH DNA SPEC QL PROBE+SIG AMP: NEGATIVE
N GONORRHOEA DNA SPEC QL NAA+PROBE: NEGATIVE

## 2024-12-19 ENCOUNTER — OFFICE VISIT (OUTPATIENT)
Dept: FAMILY MEDICINE | Facility: CLINIC | Age: 34
End: 2024-12-19
Payer: COMMERCIAL

## 2024-12-19 VITALS
HEART RATE: 87 BPM | SYSTOLIC BLOOD PRESSURE: 114 MMHG | HEIGHT: 65 IN | BODY MASS INDEX: 21.35 KG/M2 | DIASTOLIC BLOOD PRESSURE: 76 MMHG | RESPIRATION RATE: 21 BRPM | TEMPERATURE: 97.6 F | OXYGEN SATURATION: 100 %

## 2024-12-19 DIAGNOSIS — Z30.430 ENCOUNTER FOR IUD INSERTION: Primary | ICD-10-CM

## 2024-12-19 ASSESSMENT — PAIN SCALES - GENERAL: PAINLEVEL_OUTOF10: NO PAIN (0)

## 2024-12-19 NOTE — PROGRESS NOTES
"  {PROVIDER CHARTING PREFERENCE:308333}    Subjective   Janeth is a 34 year old, presenting for the following health issues:  IUD and Labs Only (Wants a herpes test)      12/19/2024     2:28 PM   Additional Questions   Roomed by Kathi CABEZAS         12/19/2024   Declines Weight   Did patient decline having their weight taken? Yes     IUD    History of Present Illness       Reason for visit:  Iud insertion /pelvic exam   She is taking medications regularly.       {MA/LPN/RN Pre-Provider Visit Orders- hCG/UA/Strep (Optional):184548}  {SUPERLIST (Optional):141938}  {additonal problems for provider to add (Optional):250130}    {ROS Picklists (Optional):175339}      Objective    /76 (BP Location: Right arm, Patient Position: Sitting, Cuff Size: Adult Regular)   Pulse 87   Temp 97.6  F (36.4  C) (Temporal)   Resp 21   Ht 1.651 m (5' 5\")   LMP 12/09/2024 (Approximate)   SpO2 100%   BMI 21.35 kg/m    Body mass index is 21.35 kg/m .  Physical Exam   {Exam List (Optional):339997}    {Diagnostic Test Results (Optional):937836}        Signed Electronically by: Brenda Gan PA-C  {Email feedback regarding this note to [email or web address]   :[id number]}  "

## 2024-12-19 NOTE — PROGRESS NOTES
"IUD Insertion:  CONSULT:    Is a pregnancy test required: No. Has not been sexually active >3 years.  Was a consent obtained?  Yes    Subjective: Janeth Perez is a 34 year old No obstetric history on file. presents for IUD and desires Mirena type IUD.    Patient has been given the opportunity to ask questions about all forms of birth control, including all options appropriate for Janeth Perez. Discussed that no method of birth control, except abstinence is 100% effective against pregnancy or sexually transmitted infection.     Janeth Perez understands she may have the IUD removed at any time. IUD should be removed by a health care provider.    The entire insertion procedure was reviewed with the patient, including care after placement.    Patient's last menstrual period was 12/09/2024 (approximate). No allergy to betadine or shellfish.   No results found for: \"HCG\"      /76 (BP Location: Right arm, Patient Position: Sitting, Cuff Size: Adult Regular)   Pulse 87   Temp 97.6  F (36.4  C) (Temporal)   Resp 21   Ht 1.651 m (5' 5\")   LMP 12/09/2024 (Approximate)   SpO2 100%   BMI 21.35 kg/m      Pelvic Exam:   EG/BUS: normal genital architecture without lesions, erythema or abnormal secretions.   Vagina: moist, pink, rugae with physiologic discharge and secretions  Cervix: nulliparous no lesions and pink, moist, closed, without lesion or CMT  Uterus: anteverted position, mobile, no pain  Adnexa: within normal limits and no masses, nodularity, tenderness    PROCEDURE NOTE: -- IUD Insertion    Reason for Insertion: contraception    Premedicated with Percocet.  Under sterile technique, cervix was visualized with speculum and prepped with Betadine solution swab x 3. Tenaculum was placed for stability. The uterus was gently straightened and sounded to 7.0 cm. IUD prepared for placement, and IUD inserted according to 's instructions without difficulty or significant resitance, " and deployed at the fundus. The strings were visualized and trimmed to 2.5 cm from the external os. Tenaculum was removed and hemostasis noted. Speculum removed.  Patient tolerated procedure well.    EBL: minimal    Complications: none    ASSESSMENT:     ICD-10-CM    1. Encounter for IUD insertion  Z30.430 levonorgestrel (MIRENA) 52 MG (20 mcg/day) IUD 1 each     levonorgestrel (MIRENA) 52 MG (20 mcg/day) IUD     INSERTION INTRAUTERINE DEVICE           PLAN:    Given 's handouts, including when to have IUD removed, list of danger s/sx, side effects and follow up recommended. Encouraged condom use for prevention of STD. Back up contraception advised for 7 days if progestin method. Advised to call for any fever, for prolonged or severe pain or bleeding, abnormal vaginal discharge, or unable to palpate strings. She was advised to use pain medications (ibuprofen) as needed for mild to moderate pain. Advised to follow-up in clinic in 4-6 weeks for IUD string check if unable to find strings or as directed by provider.     Brenda Gan PA-C

## 2025-05-24 ENCOUNTER — OFFICE VISIT (OUTPATIENT)
Dept: URGENT CARE | Facility: URGENT CARE | Age: 35
End: 2025-05-24
Payer: COMMERCIAL

## 2025-05-24 VITALS
BODY MASS INDEX: 20.71 KG/M2 | HEART RATE: 94 BPM | DIASTOLIC BLOOD PRESSURE: 85 MMHG | SYSTOLIC BLOOD PRESSURE: 126 MMHG | TEMPERATURE: 98.1 F | OXYGEN SATURATION: 97 % | RESPIRATION RATE: 12 BRPM | HEIGHT: 66 IN

## 2025-05-24 DIAGNOSIS — T26.92XA CHEMICAL BURN OF LEFT EYE: Primary | ICD-10-CM

## 2025-05-24 PROCEDURE — 3074F SYST BP LT 130 MM HG: CPT | Performed by: FAMILY MEDICINE

## 2025-05-24 PROCEDURE — 3079F DIAST BP 80-89 MM HG: CPT | Performed by: FAMILY MEDICINE

## 2025-05-24 PROCEDURE — 99213 OFFICE O/P EST LOW 20 MIN: CPT | Performed by: FAMILY MEDICINE

## 2025-05-24 RX ORDER — ERYTHROMYCIN 5 MG/G
0.5 OINTMENT OPHTHALMIC 2 TIMES DAILY
Qty: 3.5 G | Refills: 0 | Status: SHIPPED | OUTPATIENT
Start: 2025-05-24 | End: 2025-05-29

## 2025-05-24 NOTE — PROGRESS NOTES
Urgent Care Clinic Visit  Rapid rooming was initiated.  Provider was consulted, patient will remain in room and provider will be with patient as soon as possible.  Chief Complaint   Patient presents with    Eye Problem     X this morning   Reports got splash of Bar Keepers friend in left eye  Denies vision impairment   Reports burning                    5/24/2025     9:57 AM   Additional Questions   Roomed by sherri page         5/24/2025   Declines Weight   Did patient decline having their weight taken? Yes     SUBJECTIVE:  Janeth Perez, a 34 year old female scheduled an appointment to discuss the following issues:  Chemical burn of left eye    Medical, social, surgical, and family histories reviewed.    At 8:45am today, while washing dishes, patient accidentally splashed Bar Keeper's Friend, a cleaning agent containing citric acid and Oxalic acid into her left eye.  [facility] did not wash it[facility]  No fall or trauma or other injuries.  Patient is mild farsighted , has glasses but doesn't wear them, no contact lenses.  I ordered immediate rinsing of the affected eye at eyewash station ---patient tolerated 2 to 3 minutes of washing, visual acuity after irrigation was Left 10/16, right 10/12.5.  No vision change according to patient.  Had burning sensation in left eye on presentation, much less after eyewash.  Poison Control contacted---toxicology specialist advised 10 minutes of eyewash under running clean tap water; may test with pH paper (but this not available even after nurse searched all over the clinic), OK to use Proparacaine (pH neurtral) to test for corneal abrasion or erosion, may use Erythromycin ointment for lubrication and antibiotic prophylaxis after that.        ROS:  See HPI.  No nausea/vomiting.  No fever/chills.  No chest pain/SOB.  No BM/urine problems.  No syncope.      OBJECTIVE:  /85   Pulse 94   Temp 98.1  F (36.7  C) (Temporal)   Resp 12   Ht 1.676 m  "(5' 6\")   SpO2 97%   BMI 20.71 kg/m    EXAM:  GENERAL APPEARANCE: alert, mild distress, normal vitals, GCS 15, no cyanosis or accessory muscle use, moist mucus membrane  EYES: Eyes grossly normal to inspection, PERRL, and conjunctivae and sclerae normal;  Mildly injected conjunctiva left eye, normal EOM, no foreign body noted, see visual acuity above; minimal uptake of Flourescein at 5 o'clock of peripheral iris, not involving pupil region, no ulcers or dendrites  HENT: ear canals and TM's normal and nose and mouth without ulcers or lesions  NECK: no adenopathy, no asymmetry, masses, or scars, and thyroid normal to palpation  RESP: lungs clear to auscultation - no rales, rhonchi or wheezes  CV: regular rates and rhythm, normal S1 S2, no S3 or S4, and no murmur, click or rub  LYMPHATICS: no cervical adenopathy  MS: extremities normal- no gross deformities noted  SKIN: no skin involvement around the eyes; no suspicious lesions or rashes  NEURO: Normal strength and tone, mentation intact, and speech normal    ASSESSMENT/PLAN:  (T26.92XA) Chemical burn of left eye  (primary encounter diagnosis)  Comment: initial wash 2-3 minutes, after Proparacaine test, pt still had residula burnning---washed under eyewash station another 5 minutes---burning resolved.  Plan: Adult Eye  Referral, erythromycin         (ROMYCIN) 5 MG/GM ophthalmic ointment    See ophthalmologist within 48-72 hours if still symptomatic.  Care instructions given.      Pt to f/up PCP as needed if new problems arise.  Warning signs and symptoms explained.            "

## (undated) DEVICE — ESU CORD BIPOLAR 12' E0512

## (undated) DEVICE — NDL 27GA 1.25" 305136

## (undated) DEVICE — SOL WATER IRRIG 1000ML BOTTLE 2F7114

## (undated) DEVICE — ESU HARMONIC FOCUS SHEARS 9CM HAR9F

## (undated) DEVICE — GOWN IMPERVIOUS BREATHABLE SMART LG 89015

## (undated) DEVICE — TRAY PREP DRY SKIN SCRUB 067

## (undated) DEVICE — SU SILK 3-0 TIE 18" SA64H

## (undated) DEVICE — SU PROLENE 6-0 C-1DA 18" 8718H

## (undated) DEVICE — SURGICEL POWDER ABSORBABLE HEMOSTAT 3GM 3013SP

## (undated) DEVICE — SYR BULB IRRIG DOVER 60 ML LATEX FREE 67000

## (undated) DEVICE — GLOVE BIOGEL PI ULTRATOUCH G SZ 6.5 42165

## (undated) DEVICE — Device

## (undated) DEVICE — CUSTOM PACK MINOR SBA5BMNHEA

## (undated) DEVICE — TUBING SUCTION MEDI-VAC 1/4"X20' N620A - HE

## (undated) DEVICE — DRAPE U SPLIT 74X120" 29440

## (undated) DEVICE — SU SILK 2-0 SH 30" K833H

## (undated) DEVICE — ESU ELEC BLADE 2.75" COATED/INSULATED E1455

## (undated) DEVICE — SU MONOCRYL+ 4-0 18IN PS2 UND MCP496G

## (undated) DEVICE — PREP POVIDONE-IODINE 7.5% SCRUB 4OZ BOTTLE MDS093945

## (undated) DEVICE — PREP POVIDONE IODINE SOLUTION 10% 4OZ BOTTLE 29906-004

## (undated) DEVICE — DRAPE SHEET REV FOLD 3/4 9349

## (undated) DEVICE — ESU PENCIL SMOKE EVAC W/ROCKER SWITCH 0703-047-000

## (undated) DEVICE — RETR ELASTIC STAYS LONE STAR BLUNT SGL PK 3350-1G

## (undated) RX ORDER — DEXAMETHASONE SODIUM PHOSPHATE 10 MG/ML
INJECTION, SOLUTION INTRAMUSCULAR; INTRAVENOUS
Status: DISPENSED
Start: 2022-08-16

## (undated) RX ORDER — PROPOFOL 10 MG/ML
INJECTION, EMULSION INTRAVENOUS
Status: DISPENSED
Start: 2022-08-16

## (undated) RX ORDER — FENTANYL CITRATE 50 UG/ML
INJECTION, SOLUTION INTRAMUSCULAR; INTRAVENOUS
Status: DISPENSED
Start: 2022-08-16

## (undated) RX ORDER — ONDANSETRON 2 MG/ML
INJECTION INTRAMUSCULAR; INTRAVENOUS
Status: DISPENSED
Start: 2022-08-16

## (undated) RX ORDER — LIDOCAINE HYDROCHLORIDE 10 MG/ML
INJECTION, SOLUTION EPIDURAL; INFILTRATION; INTRACAUDAL; PERINEURAL
Status: DISPENSED
Start: 2022-08-16